# Patient Record
Sex: MALE | Race: WHITE | NOT HISPANIC OR LATINO | Employment: OTHER | URBAN - METROPOLITAN AREA
[De-identification: names, ages, dates, MRNs, and addresses within clinical notes are randomized per-mention and may not be internally consistent; named-entity substitution may affect disease eponyms.]

---

## 2017-01-17 ENCOUNTER — OFFICE VISIT (OUTPATIENT)
Dept: NEUROLOGY | Facility: HOSPITAL | Age: 82
End: 2017-01-17
Attending: SURGERY
Payer: MEDICARE

## 2017-01-17 ENCOUNTER — CLINICAL SUPPORT (OUTPATIENT)
Dept: NEUROLOGY | Facility: HOSPITAL | Age: 82
End: 2017-01-17
Payer: MEDICARE

## 2017-01-17 VITALS
HEART RATE: 45 BPM | SYSTOLIC BLOOD PRESSURE: 161 MMHG | WEIGHT: 183 LBS | RESPIRATION RATE: 18 BRPM | TEMPERATURE: 97 F | BODY MASS INDEX: 26.2 KG/M2 | HEIGHT: 70 IN | DIASTOLIC BLOOD PRESSURE: 82 MMHG

## 2017-01-17 DIAGNOSIS — Z71.89 ENCOUNTER FOR DIABETES EDUCATION: ICD-10-CM

## 2017-01-17 DIAGNOSIS — C7B.8 SECONDARY NEUROENDOCRINE TUMOR OF LIVER: ICD-10-CM

## 2017-01-17 DIAGNOSIS — C7A.012 MALIGNANT CARCINOID TUMOR OF ILEUM: ICD-10-CM

## 2017-01-17 DIAGNOSIS — K86.89 PANCREATIC INSUFFICIENCY: Primary | ICD-10-CM

## 2017-01-17 DIAGNOSIS — C7B.8 SECONDARY NEUROENDOCRINE TUMOR OF DISTANT LYMPH NODES: ICD-10-CM

## 2017-01-17 DIAGNOSIS — C7A.012 MALIGNANT CARCINOID TUMOR OF ILEUM: Primary | ICD-10-CM

## 2017-01-17 PROCEDURE — 99212 OFFICE O/P EST SF 10 MIN: CPT | Mod: 27

## 2017-01-17 RX ORDER — PANCRELIPASE 24000; 76000; 120000 [USP'U]/1; [USP'U]/1; [USP'U]/1
2 CAPSULE, DELAYED RELEASE PELLETS ORAL
Qty: 900 CAPSULE | Refills: 3 | Status: SHIPPED | OUTPATIENT
Start: 2017-01-17

## 2017-01-17 RX ORDER — LANREOTIDE ACETATE 120 MG/.5ML
120 INJECTION SUBCUTANEOUS
COMMUNITY

## 2017-01-17 NOTE — PROGRESS NOTES
"NOLANETS:  [facility]  A collaboration between [facility] and [facility]    PATIENT: Delmar Mckee  MRN: [id number]  DATE: 1/17/2017    Vitals:    01/17/17 1049 01/17/17 1054   BP:  (!) 161/82   Pulse: (!) 45    Resp: 18    Temp: 97 °F (36.1 °C)    TempSrc: Oral    Weight: 83 kg (183 lb)    Height: 5' 10" (1.778 m)       Last 2 Weight Measurements:   Wt Readings from Last 2 Encounters:   01/17/17 83 kg (183 lb)   09/23/16 83.9 kg (185 lb)     BMI: Body mass index is 26.26 kg/(m^2).    Karnofsky Score    Karnofsky Score:  100% - Normal, No Complaints, No Evidence of Disease        Diagnosis:   1. 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 Secondary neuroendocrine tumor of liver      Interval History: Mr. 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-- now on lanreotide -- following his original surgery had nanoknife/RFA followed by annemarie ferguson x 2     Past Medical History   Diagnosis Date    Diabetes mellitus type II, controlled     Drug therapy      lanreotide 6/2016    HTN (hypertension) 2016    Hypothyroidism     Malignant carcinoid tumor of ileum 2009       Past Surgical History   Procedure Laterality Date    Abdominal surgery  2009    Drug therapy       sandostatin 30 mg every 28 days    Drug trial  2015 summer     pazopanib    Nanoknofe  2011    Rfa  2011     Pt denies having in 2012, 2013 (7/26/16)    Annemarie ferguson  2012, 2013       Review of patient's allergies indicates:   Allergen Reactions    Epinephrine Other (See Comments)     Carcinoid crisis    Latex, natural rubber Rash       Current Outpatient Prescriptions   Medication Sig Dispense Refill    aspirin (ECOTRIN) 81 MG EC tablet Take by mouth.      atorvastatin (LIPITOR) 10 MG tablet 10 mg every evening.       cholecalciferol, vitamin D3, 2,000 unit " Tab Take 1 tablet by mouth once daily.       cholestyramine (QUESTRAN) 4 gram packet TAKE 1 PACKET TWICE DAILY IN WATER  0    cinnamon bark 500 mg capsule Take by mouth.      co-enzyme Q-10 30 mg capsule Take by mouth.      CREON 24,000-76,000 -120,000 unit capsule EARLY TILL 1/21*1 CAP(S) BY mouth daily with every meal  3    lanreotide (SOMATULINE DEPOT) 120 mg/0.5 mL Syrg Inject 120 mg into the skin every 28 days.      levothyroxine (SYNTHROID, LEVOTHROID) 175 MCG tablet Take 175 mcg by mouth once daily.  8    metformin (GLUCOPHAGE) 850 MG tablet Take 850 mg by mouth 2 (two) times daily with meals.   3    multivitamin (THERAGRAN) per tablet Take 1 tablet by mouth once daily.       ramipril (ALTACE) 10 MG capsule 2 (two) times daily.       saw palmetto fruit 450 mg Cap Take 1 capsule by mouth.      temazepam (RESTORIL) 15 mg Cap 15 mg every evening.       terazosin (HYTRIN) 1 MG capsule 1 mg every evening.       vitamin A 8000 UNIT capsule Take 8,000 Units by mouth.      levothyroxine (SYNTHROID) 150 MCG tablet Take 150 mcg by mouth.       No current facility-administered medications for this visit.        Review of Systems     Number of Days per Week Number per Day   DIARRHEA occasional    BRISTOL STOOL SCALE RATING Type 4-6    FLUSHING With alcohol    WHEEZING 0      WEIGHT GAIN/LOSS stable   ENERGY RATING (0-10) 10      Physical Exam deferred.     Pathology Data:  No new tissue      Laboratory Data:  Neuroendocrine Labs Latest Ref Rng & Units 12/8/2016   EXT 5 HIAA 24 HR URINE 0 - 22 ng/ml 335 (A)   EXT 5 HIAA BLOOD 0 - 22 ng/ml    EXT SEROTONIN 21 - 321 ng/ml 1099 (A)   EXT CHROMOGRANIN A 0 - 5 nmol/l    EXT PANCREASTATIN LIZETTE 10 - 135 pg/ml 911 (A)   EXT NEUROKININ A LIZETTE 0 - 40 pg/ml 15   OCTREOTIDE LEVEL     EXT OCTREOTIDE LEVEL pg/ml    WBC 3.90 - 12.70 K/uL    EXT WBC 3.4 - 10.8 x10e3/ul    HGB 14.0 - 18.0 g/dL    EXT HGB 12.6 - 17.7 g/dl    HCT 40.0 - 54.0 %    EXT HCT 37.5 - 51.0 %     PLATLETS 150 - 350 K/uL    EXT PLATLETS 150 - 379 x10e3/ul    PT 9.0 - 12.5 sec    PTT 21.0 - 32.0 sec    INR 0.8 - 1.2    GLUCOSE 70 - 110 mg/dL    EXT GLUCOSE 65 - 99 mg/dl    BUN 8 - 23 mg/dL    EXT BUN 8 - 27 mg/dl    CREATININE 0.5 - 1.4 mg/dL    EXT CREATININE 0.76 - 1.27 mg/dl     - 145 mmol/L    EXT  - 144 mmol/l    K 3.5 - 5.1 mmol/L    EXT K 3.5 - 5.2 mmol/l    CHLORIDE 95 - 110 mmol/L    EXT CHLORIDE 97 - 106 mmol/l    CO2 23 - 29 mmol/L    EXT CO2 18 - 29 mmol/l    CALCIUM 8.7 - 10.5 mg/dL    EXT CALCIUM 8.6 - 10.2 mg/dl    PROTEIN, TOTAL 6.0 - 8.4 g/dL    EXT PROTEIN, TOTAL 6.0 - 8.5 g/dl    ALBUMIN 3.5 - 5.2 g/dL    EXT ALBUMIN 3.5 - 4.8 g/dl    TOTAL BILIRUBIN 0.1 - 1.0 mg/dL    EXT TOTAL BILIRUBIN 0.0 - 1.2 mg/dl    ALK PHOSPHATASE 55 - 135 U/L    EXT ALK PHOSPHATASE 39 - 117 iu/l    SGOT (AST) 10 - 40 U/L    EXT SGOT (AST) 0 - 40 iu/l    SGPT (ALT) 10 - 44 U/L    EXT ALT 0 - 44 iu/l    Weight               Radiology Data:                        Impression:  Increase in liver lesion s/p TACE  Recent--consider additional regional therapy     Plan: consult IR about additional TACE      Labs: Markers: 3 month intervals            Other: 6 month intervals    Scans: 6 month intervals    Return to Clinic:6 month intervals    Orders placed this visit: No orders of the defined types were placed in this encounter.       25 Minutes Face-to-Face; Counseling/Coordination of Care > 50 percent of Visit     Bogdan Waterman MD, FACS  [employer] Professor of Surgery, [facility]  [address], [address]  [place]  [address]  Cell [phone]  ph. [phone]; [phone]  fax. [phone]  [email or web address]

## 2017-01-17 NOTE — PATIENT INSTRUCTIONS
Nurse Parrish will speak with interventional radiologist to discuss plan for upcoming scheduled TACE #2 in March 9 and 10 2017- will notify patient after advised    Echocardiogram yearly in April

## 2017-01-17 NOTE — PATIENT INSTRUCTIONS
Diabetes: Meal Planning  You can help keep your blood sugar level in your target range by eating healthy foods. Your healthcare team can help you create a low-fat, nutritious meal plan. Take an active role in your diabetes management by following your meal plan and working with your healthcare team.    Make your meal plan  A meal plan gives guidelines for the types and amounts of food you should eat. The goal is to balance food and insulin (or other diabetes medications) so your blood sugars will be in your target range. Your dietitian will help you make a flexible meal plan that includes many foods that you like.  Watch serving sizes  Your meal plan will group foods by servings. To learn how much a serving is, start by measuring food portions at each meal. Soon youll know what a serving looks like on your plate. Ask your healthcare provider about how to balance servings of different foods.  Eat from all the food groups  The basis of a healthy meal plan is variety (eating lots of different foods). Choose lean meats, fresh fruits and vegetables, whole grains, and low-fat or nonfat dairy products. Eating a wide variety of foods provides the nutrients your body needs. It can also keep you from getting bored with your meal plan.  Learn about carbohydrates, fats, and protein  · Carbohydrates are starches, sugars, and fiber. They are found in many foods, including fruit, bread, pasta, milk, and sweets. Of all the foods you eat, carbohydrates have the most effect on your blood sugar. Your dietitian may teach you about carb counting, a way to figure out the number of carbohydrates in a meal.  · Fats have the most calories. They also have the most effect on your weight and your risk of heart disease. When you have diabetes, its important to control your weight and protect your heart. Foods that are high in fat include whole milk, cheese, snack foods, and desserts.  · Protein is important for building and repairing  muscles and bones. Choose low-fat protein sources, such as fish, egg whites, and skinless chicken.  Reduce liquid sugars  Extra calories from sodas, sports drinks, and fruit drinks make it hard to keep blood sugar in range. Cut as many liquid sugars from your meal plan as you can.  This includes most fruit juices, which are often high in natural or added sugar. Instead, drink plenty of water and other sugar-free beverages.  Eat less fat  If you need to lose weight, try to reduce the amount of fat in your diet. This can also help lower your cholesterol level to keep blood vessels healthier. Cut fat by using only small amounts of liquid oil for cooking. Read food labels carefully to avoid foods with unhealthy trans fats.  Timing your meals  When it comes to blood sugar control, when you eat is as important as what you eat. You may need to eat several small meals spaced evenly throughout the day to stay in your target range. So dont skip breakfast or wait until late in the day to get most of your calories. Doing so can cause your blood sugar to rise too high or fall too low.   © 3015-1237 [employer]. [address] [place] [address]. All rights reserved. This information is not intended as a substitute for professional medical care. Always follow your healthcare professional's instructions.      Preventing Diarrhea due to Rapid Transit Time    *Symptoms of dumping syndrome include: feeling weak, dizzy and/or flushed within 30 minutes of eating. Cramping, pain, nausea, diarrhea and/or sweating may also occur.     Tips to avoid diarrhea related to rapid transit and dumping syndrome:   Eat 4-6 small meals throughout the day   Try to eat a source of protein at each meal (such as: poultry, red meat, eggs, tofu, milk, yogurt, or cheese)   Limit concentrated sugars like candies, cookies, soda, juice, and syrup   Drink fluids 30-60 minutes before and after meals and snacks, but not during  meals   Choose foods with soluble fibers (such as: oats, quinoa, fruits and vegetables without peels, and legumes)   Avoid extreme hot or cold foods and beverages   Eat slowly and rest for 15 minutes after a meal with feet up    Food Group Foods Allowed Foods to Avoid   Beverages  6-8 cups daily (no ice) Decaffeinated coffee and tea. Sugar free beverages, water drinks without sugar, water Caffeinated coffee or tea. Beverages made with sugar, corn syrup, or honey. Fruit juices and fruit drinks. Carbonated drinks.    Starches/Grains  8-12 servings daily Complex carbohydrates (such as: white bread, oatmeal, quinoa, cereal, potatoes, barley, white rice, and pasta).   High soluble fiber Sweet rolls, donuts, pastries, and cakes. Sugar cereals. Whole wheat, rye, pumpernickel brown rice and whole grains.  Low insoluble fiber   Meat and Other Protein Foods  Limit red meat to 12 ounces weekly Tender, well-cooked meats, poultry, and fish. Egg whites (whole eggs if tolerated). Soy foods prepared without added fat. Smooth peanut butter allowed if tolerable of fats. Powdered peanut butter (PB2) Fried meat, poultry, or fish. Luncheon meats (i.e. Bologna or salami). Sausage, hot dogs, dubois. Tough or chewy meats. Dried beans and peas. (i.e. Luong or kidney beans). Seeds or nuts.   Fats  1-3 tsp per meal A small to moderate amount of fat as tolerated: MCT oil, coconut oil, olive oil, canola, butter, margarine, cream, and cream cheese. High amounts of fat such as fried foods, avocados, olives, butter, vegetable oils, fried foods.   Fruits  3-6 servings daily Canned in fruit juice, light syrup and drained. Fresh fruit without the peel. Diluted fruit 1 part to 3 water Canned fruit in sugar or syrup. Fruit juice. Dried fruits including prunes and raisins.   Vegetables  3-6 servings daily Fresh, frozen or canned vegetables cooked to a soft consistency. Raw vegetables (unless finely chopped).   Milk or Milk Foods  2-3 servings daily  Milk (buttermilk, skim, 1% fat, and soy milk with no added sugar). Plain yogurt with no added sugar. Lactose free products. Cheese. Low-fat sugar ice cream. Whole milk, chocolate milk, half and half, regular ice cream, or creamers.   Miscellaneous Any allowed foods made with artificial sweeteners including: saccharin (Sweet 'N Low), sucralose (Splenda), and acesulfame potassium (Sunette, SweetOne), Stevia. Sugar, honey, syrup, jelly. Any sugar alcohol (such as: sorbitol, isomalt, hydrogenated starch hydrosylat or mannitol or xylitol).  Foods that list sugar, honey, syrup, xylitol, or sorbitol as one of the first three ingredients on the food label.     Source: http://applications.[employer].org/education/document.aspx?url=Patient+Education%7Nk84665.pdf

## 2017-01-17 NOTE — LETTER
January 17, 2017        Rian Melendez DO  [address]  [facility] [address]       NOLANETS: [facility]  A collaboration between [facility] and [facility] [address]  [address]  [place] [address]  Phone: [phone]  Fax: [phone]        FRANCINE Vizcarra M.D., FACS  Charbel Kebede M.D.  Obinna Mccollum M.D.   Dawn Braun M.D., FACS  DO Bogdan Dalal M.D., FACS   Patient: Delmar Mckee   MR Number: [id number]   YOB: 1935   Date of Visit: 1/17/2017     Dear Dr. Melendez    Thank you for the kind referral of Delmar Mckee. We saw this patient on 1/17/2017, and a copy of our clinic note is enclosed. We certainly appreciate the opportunity to participate in your patient's care.     If you have any questions or wish to discuss your patient further, please do not hesitate to contact us at [phone].       Kindest personal regards,          Bogdan Waterman MD, FACS  The And[employer] Herman Professor of Surgery & Neurosciences  [facility], Dept. Of Surgery  [address] [place] [address], [address]  [place] [address] [phone]

## 2017-01-17 NOTE — MR AVS SNAPSHOT
[facility]  [address] [place] [address]  Phone: [phone]  Fax: [phone]                  Delmar Mckee   2017 11:30 AM   Office Visit    Description:  Male : 1935   Provider:  Bogdan Waterman MD   Department:  [facility]           Reason for Visit     Hepatic Tumor     Carcinoid Tumor     Follow-up     ileum     Lymph Node Metastasis           Diagnoses this Visit        Comments    Malignant carcinoid tumor of ileum    -  Primary     Secondary neuroendocrine tumor of distant lymph nodes         Secondary neuroendocrine tumor of liver                To Do List           Future Appointments        Provider Department Dept Phone    3/9/2017 11:45 AM Dwight Braun MD [facility] [phone]      Goals (5 Years of Data)     None      Follow-Up and Disposition     Return in about 6 months (around 2017).    Follow-up and Disposition History      [facility]     [facility] -  Assistance  Registered nurses in the [facility] provide clinical advisement, health education, appointment booking, and other advisory services.  Call for this free service at [phone].             Medications           Message regarding Medications     Verify the changes and/or additions to your medication regime listed below are the same as discussed with your clinician today.  If any of these changes or additions are incorrect, please notify your healthcare provider.        STOP taking these medications     magnesium oxide-Mg AA chelate 133 mg Tab Take by mouth.    ondansetron (ZOFRAN-ODT) 8 MG TbDL Take 1 tablet (8 mg total) by mouth every 6 (six) hours as needed (nausea).    oxycodone-acetaminophen (PERCOCET) 5-325 mg per tablet Take 1 tablet by mouth every 4 (four) hours as needed for Pain.           Verify that the below list of medications is an accurate representation of the  "medications you are currently taking.  If none reported, the list may be blank. If incorrect, please contact your healthcare provider. Carry this list with you in case of emergency.           Current Medications     aspirin (ECOTRIN) 81 MG EC tablet Take by mouth.    atorvastatin (LIPITOR) 10 MG tablet 10 mg every evening.     cholecalciferol, vitamin D3, 2,000 unit Tab Take 1 tablet by mouth once daily.     cholestyramine (QUESTRAN) 4 gram packet TAKE 1 PACKET TWICE DAILY IN WATER    cinnamon bark 500 mg capsule Take by mouth.    co-enzyme Q-10 30 mg capsule Take by mouth.    CREON 24,000-76,000 -120,000 unit capsule EARLY TILL 1/21*1 CAP(S) BY mouth daily with every meal    lanreotide (SOMATULINE DEPOT) 120 mg/0.5 mL Syrg Inject 120 mg into the skin every 28 days.    levothyroxine (SYNTHROID, LEVOTHROID) 175 MCG tablet Take 175 mcg by mouth once daily.    metformin (GLUCOPHAGE) 850 MG tablet Take 850 mg by mouth 2 (two) times daily with meals.     multivitamin (THERAGRAN) per tablet Take 1 tablet by mouth once daily.     ramipril (ALTACE) 10 MG capsule 2 (two) times daily.     saw palmetto fruit 450 mg Cap Take 1 capsule by mouth.    temazepam (RESTORIL) 15 mg Cap 15 mg every evening.     terazosin (HYTRIN) 1 MG capsule 1 mg every evening.     vitamin A 8000 UNIT capsule Take 8,000 Units by mouth.    levothyroxine (SYNTHROID) 150 MCG tablet Take 150 mcg by mouth.           Clinical Reference Information           Vital Signs - Last Recorded  Most recent update: 1/17/2017 10:55 AM by Angeline Lizama RN    BP Pulse Temp Resp Ht Wt    (!) 161/82 (!) 45 97 °F (36.1 °C) (Oral) 18 5' 10" (1.778 m) 83 kg (183 lb)    BMI                26.26 kg/m2          Blood Pressure          Most Recent Value    BP  (!)  161/82      Allergies as of 1/17/2017     Epinephrine    Latex, Natural Rubber      Immunizations Administered on Date of Encounter - 1/17/2017     None      Orders Placed During Today's Visit      Normal Orders This " Visit    5-HIAA Plasma (Neuroendocrine)     CBC auto differential     Comprehensive metabolic panel     CT Abdomen Pelvis With Contrast - Triple Phase     Lanreotide Drug Levels     MRI Abdomen W WO Contrast     Neurokinin A     Pancreastatin     Serotonin (Neuroendo)     Future Labs/Procedures Expected by Expires    5-HIAA Plasma (Neuroendocrine)  1/17/2017 3/18/2018    CT Abdomen Pelvis With Contrast - Triple Phase  1/17/2017 1/17/2018    Lanreotide Drug Levels  1/17/2017 3/18/2018    MRI Abdomen W WO Contrast  1/17/2017 1/17/2018    Neurokinin A  1/17/2017 3/18/2018    Pancreastatin  1/17/2017 3/18/2018    Serotonin (Neuroendo)  1/17/2017 3/18/2018    CBC auto differential  7/16/2017 3/18/2018    Comprehensive metabolic panel  7/16/2017 3/18/2018      Instructions    Nurse Shivani will speak with interventional radiologist to discuss plan for upcoming scheduled TACE #2 in March 9 and 10 2017- will notify patient after advised    Echocardiogram yearly in April

## 2017-01-26 ENCOUNTER — TELEPHONE (OUTPATIENT)
Dept: NEUROLOGY | Facility: HOSPITAL | Age: 82
End: 2017-01-26

## 2017-01-26 NOTE — TELEPHONE ENCOUNTER
Called pt to notify of below stated per Dr. Tinsley. Unable to leave message. Will reattempt to contact pt at later time.

## 2017-01-26 NOTE — TELEPHONE ENCOUNTER
----- Message from Jayme Tinsley MD sent at 1/20/2017  8:58 AM CST -----  Left. And he definitely will need treatment on the right. He will need new scans before then. Plan may change depending on the findings on that study. He has considerable disease and I would suggest treatment earlier than in March. Some of his lesions appear larger than on the pre treatment scan and I don't know if this is bc the pre treatment scan was 5 months before the treatment or not. It is very important to get imaging within 30 days of TACE.    Jayme      ----- Message -----     From: Shivani Zaldivar LPN     Sent: 1/20/2017   8:26 AM       To: MD Dr. Alvina Ackerman,    Dr. Waterman would like you to review this patient's scans and advise whether you will be doing the right or left or both sides when he returns for his next TACE in March 2017.     Thank you,    Shivani : )

## 2017-01-30 NOTE — TELEPHONE ENCOUNTER
[facility]       Caller: [phone] (Today,  3:37 PM)                     EAW- Patient is calling in regards to his past TACE procedure and to look at the MRI's to see what the next steps are. Please call back to assist.

## 2017-01-30 NOTE — TELEPHONE ENCOUNTER
Returned pt's call. Informed of below stated. Pt reports he is sending an email to Dr. Waterman to see if he can do CAPTEM instead of having any further TACE and make a decision from there.

## 2017-01-31 ENCOUNTER — TELEPHONE (OUTPATIENT)
Dept: NEUROLOGY | Facility: HOSPITAL | Age: 82
End: 2017-01-31

## 2017-01-31 NOTE — TELEPHONE ENCOUNTER
----- Message from Karely Waggoner sent at 1/31/2017  2:46 PM CST -----  EAW- Patient states he would like his updated CT orders mailed to him.

## 2017-02-10 ENCOUNTER — TELEPHONE (OUTPATIENT)
Dept: NEUROLOGY | Facility: HOSPITAL | Age: 82
End: 2017-02-10

## 2017-02-10 NOTE — TELEPHONE ENCOUNTER
----- Message from Jeanie Washburn sent at 2/10/2017 11:14 AM CST -----  Contact: Patient  EAW- Patient called regarding the skipping shot and possible seeing Dr. Rodriguez. Patients call back number [phone]

## 2017-02-13 ENCOUNTER — TELEPHONE (OUTPATIENT)
Dept: NEUROLOGY | Facility: HOSPITAL | Age: 82
End: 2017-02-13

## 2017-02-13 NOTE — TELEPHONE ENCOUNTER
----- Message from Adwoa Reaves sent at 2/13/2017  2:39 PM CST -----  Contact: Patient  EAW- Patient is returning Summers call. Please call the patient at  [phone].

## 2017-03-02 ENCOUNTER — TELEPHONE (OUTPATIENT)
Dept: NEUROLOGY | Facility: HOSPITAL | Age: 82
End: 2017-03-02

## 2017-03-02 NOTE — TELEPHONE ENCOUNTER
----- Message from Karely Waggoner sent at 3/1/2017  4:09 PM CST -----  EAW- Patient is calling regarding info on the GA68 scan. Please call patient back at [phone]. Thanks

## 2017-03-02 NOTE — TELEPHONE ENCOUNTER
Spoke with pt regarding availbility of Ga 68 scan in June.  He is sched to get TACE #2 next week.  Suggested that it is best to have the Ga 68 scan 2-3 mos post last TACE.  Verbalized understanding.

## 2017-03-09 ENCOUNTER — OFFICE VISIT (OUTPATIENT)
Dept: NEUROLOGY | Facility: HOSPITAL | Age: 82
DRG: 988 | End: 2017-03-09
Attending: SURGERY
Payer: MEDICARE

## 2017-03-09 ENCOUNTER — INFUSION (OUTPATIENT)
Dept: INFUSION THERAPY | Facility: HOSPITAL | Age: 82
DRG: 988 | End: 2017-03-09
Attending: SURGERY
Payer: MEDICARE

## 2017-03-09 VITALS
HEIGHT: 69 IN | SYSTOLIC BLOOD PRESSURE: 132 MMHG | DIASTOLIC BLOOD PRESSURE: 85 MMHG | RESPIRATION RATE: 18 BRPM | BODY MASS INDEX: 27.49 KG/M2 | WEIGHT: 185.63 LBS | HEART RATE: 48 BPM | TEMPERATURE: 98 F

## 2017-03-09 DIAGNOSIS — C7B.02 SECONDARY MALIGNANT CARCINOID TUMOR OF LIVER: ICD-10-CM

## 2017-03-09 DIAGNOSIS — C7B.01 SECONDARY CARCINOID TUMOR OF DISTANT LYMPH NODES: ICD-10-CM

## 2017-03-09 DIAGNOSIS — C78.7 SECONDARY MALIGNANT NEOPLASM OF LIVER: ICD-10-CM

## 2017-03-09 DIAGNOSIS — C7B.8 SECONDARY NEUROENDOCRINE TUMOR OF LIVER: Primary | ICD-10-CM

## 2017-03-09 DIAGNOSIS — C7B.8 SECONDARY NEUROENDOCRINE TUMOR OF LIVER: ICD-10-CM

## 2017-03-09 DIAGNOSIS — C7A.012 MALIGNANT CARCINOID TUMOR OF ILEUM: ICD-10-CM

## 2017-03-09 RX ORDER — MIDAZOLAM HYDROCHLORIDE 5 MG/ML
0.5 INJECTION INTRAMUSCULAR; INTRAVENOUS ONCE
Status: CANCELLED | OUTPATIENT
Start: 2017-03-09 | End: 2017-03-09

## 2017-03-09 RX ORDER — LANREOTIDE ACETATE 120 MG/.5ML
120 INJECTION SUBCUTANEOUS ONCE
Status: CANCELLED | OUTPATIENT
Start: 2017-03-09 | End: 2017-03-09

## 2017-03-09 RX ORDER — IBUPROFEN 200 MG
400 TABLET ORAL EVERY 4 HOURS PRN
Status: CANCELLED | OUTPATIENT
Start: 2017-03-09

## 2017-03-09 RX ORDER — SODIUM CHLORIDE 9 MG/ML
500 INJECTION, SOLUTION INTRAVENOUS ONCE
Status: CANCELLED | OUTPATIENT
Start: 2017-03-09 | End: 2017-03-09

## 2017-03-09 RX ORDER — DEXAMETHASONE SODIUM PHOSPHATE 4 MG/ML
8 INJECTION, SOLUTION INTRA-ARTICULAR; INTRALESIONAL; INTRAMUSCULAR; INTRAVENOUS; SOFT TISSUE ONCE
Status: CANCELLED | OUTPATIENT
Start: 2017-03-09 | End: 2017-03-09

## 2017-03-09 RX ORDER — HYDROMORPHONE HCL IN 0.9% NACL 6 MG/30 ML
PATIENT CONTROLLED ANALGESIA SYRINGE INTRAVENOUS CONTINUOUS
Status: CANCELLED | OUTPATIENT
Start: 2017-03-09

## 2017-03-09 RX ORDER — FAMOTIDINE 20 MG/1
20 TABLET, FILM COATED ORAL EVERY 12 HOURS
Status: CANCELLED | OUTPATIENT
Start: 2017-03-09

## 2017-03-09 RX ORDER — DEXTROSE MONOHYDRATE, SODIUM CHLORIDE, AND POTASSIUM CHLORIDE 50; 1.49; 4.5 G/1000ML; G/1000ML; G/1000ML
INJECTION, SOLUTION INTRAVENOUS CONTINUOUS
Status: CANCELLED | OUTPATIENT
Start: 2017-03-09

## 2017-03-09 RX ORDER — LANREOTIDE ACETATE 120 MG/.5ML
120 INJECTION SUBCUTANEOUS ONCE
Status: COMPLETED | OUTPATIENT
Start: 2017-03-09 | End: 2017-03-09

## 2017-03-09 RX ORDER — LIDOCAINE HYDROCHLORIDE 10 MG/ML
1 INJECTION, SOLUTION EPIDURAL; INFILTRATION; INTRACAUDAL; PERINEURAL ONCE
Status: CANCELLED | OUTPATIENT
Start: 2017-03-09 | End: 2017-03-09

## 2017-03-09 RX ORDER — NALOXONE HCL 0.4 MG/ML
0.02 VIAL (ML) INJECTION
Status: CANCELLED | OUTPATIENT
Start: 2017-03-09

## 2017-03-09 RX ORDER — ONDANSETRON 2 MG/ML
4 INJECTION INTRAMUSCULAR; INTRAVENOUS EVERY 12 HOURS PRN
Status: CANCELLED | OUTPATIENT
Start: 2017-03-09

## 2017-03-09 RX ORDER — LACTULOSE 10 G/15ML
10 SOLUTION ORAL; RECTAL
COMMUNITY

## 2017-03-09 RX ORDER — ONDANSETRON 2 MG/ML
4 INJECTION INTRAMUSCULAR; INTRAVENOUS EVERY 8 HOURS PRN
Status: CANCELLED | OUTPATIENT
Start: 2017-03-09

## 2017-03-09 RX ORDER — NATEGLINIDE 60 MG/1
60 TABLET ORAL
COMMUNITY

## 2017-03-09 RX ORDER — FUROSEMIDE 10 MG/ML
20 INJECTION INTRAMUSCULAR; INTRAVENOUS ONCE
Status: CANCELLED | OUTPATIENT
Start: 2017-03-09 | End: 2017-03-09

## 2017-03-09 RX ORDER — DIPHENHYDRAMINE HYDROCHLORIDE 50 MG/ML
50 INJECTION INTRAMUSCULAR; INTRAVENOUS ONCE
Status: CANCELLED | OUTPATIENT
Start: 2017-03-09 | End: 2017-03-09

## 2017-03-09 RX ORDER — FENTANYL CITRATE 50 UG/ML
50 INJECTION, SOLUTION INTRAMUSCULAR; INTRAVENOUS ONCE
Status: CANCELLED | OUTPATIENT
Start: 2017-03-09 | End: 2017-03-09

## 2017-03-09 RX ORDER — MANNITOL 250 MG/ML
12.5 INJECTION, SOLUTION INTRAVENOUS ONCE
Status: CANCELLED | OUTPATIENT
Start: 2017-03-09 | End: 2017-03-09

## 2017-03-09 RX ADMIN — LANREOTIDE ACETATE 120 MG: 120 INJECTION SUBCUTANEOUS at 01:03

## 2017-03-09 NOTE — NURSING
Left message for pt to arrive at 0700.. advd npo [place] and take all meds with exception of blood thinners and dm meds. advd must have a ride.

## 2017-03-09 NOTE — PROGRESS NOTES
"NOLANETS:  [facility]  A collaboration between [facility] and [facility]      PATIENT: Delmar Mckee  MRN: [id number]  DATE: 3/9/2017    Subjective:      Chief Complaint: No chief complaint on file.  feeling ok    Has left lower quadrant pain      Vitals:   Vitals:    03/09/17 1141   BP: 132/85   Pulse: (!) 48   Resp: 18   Temp: 98.2 °F (36.8 °C)   TempSrc: Oral   Weight: 84.2 kg (185 lb 9.6 oz)   Height: 5' 9" (1.753 m)        Karnofsky Score:     Diagnosis: No diagnosis found.     Oncologic History:     Interval History:     Past Medical History:  Past Medical History:   Diagnosis Date    Diabetes mellitus type II, controlled     Drug therapy     lanreotide 6/2016    HTN (hypertension) 2016    Hypothyroidism     Malignant carcinoid tumor of ileum 2009       Past Surgical History:  Past Surgical History:   Procedure Laterality Date    ABDOMINAL SURGERY  2009    bland embo  2012, 2013    drug therapy      sandostatin 30 mg every 28 days    drug trial  2015 summer    pazopanib    nanoknofe  2011    RFA  2011    Pt denies having in 2012, 2013 (7/26/16)       Family History:  No family history on file.    Allergies:  Review of patient's allergies indicates:   Allergen Reactions    Epinephrine Anaphylaxis     Can cause Carcinoid Crisis    Latex, natural rubber Rash       Medications:  Current Outpatient Prescriptions   Medication Sig Dispense Refill    atorvastatin (LIPITOR) 10 MG tablet 10 mg every evening.       cholecalciferol, vitamin D3, 2,000 unit Tab Take 1 tablet by mouth once daily.       cholestyramine (QUESTRAN) 4 gram packet TAKE 1 PACKET TWICE DAILY IN WATER  0    CREON 24,000-76,000 -120,000 unit capsule Take 2 capsules by mouth 4 (four) times daily with meals and nightly. (Patient taking differently: Take 2 capsules by mouth 3 (three) times daily with meals. ) 900 capsule 3    lactulose " (CHRONULAC) 10 gram/15 mL solution Take 10 g by mouth 3 (three) times daily.      lanreotide (SOMATULINE DEPOT) 120 mg/0.5 mL Syrg Inject 120 mg into the skin every 28 days.      levothyroxine (SYNTHROID, LEVOTHROID) 175 MCG tablet Take 175 mcg by mouth once daily.  8    metformin (GLUCOPHAGE) 850 MG tablet Take 850 mg by mouth 2 (two) times daily with meals.   3    multivitamin (THERAGRAN) per tablet Take 1 tablet by mouth once daily.       nateglinide (STARLIX) 60 MG tablet Take 60 mg by mouth 3 (three) times daily before meals.      ramipril (ALTACE) 10 MG capsule 2 (two) times daily.       saw palmetto fruit 450 mg Cap Take 1 capsule by mouth 2 (two) times daily.       temazepam (RESTORIL) 15 mg Cap 15 mg every evening.       terazosin (HYTRIN) 1 MG capsule 1 mg every evening.       vitamin A 8000 UNIT capsule Take 8,000 Units by mouth.      aspirin (ECOTRIN) 81 MG EC tablet Take by mouth.      cinnamon bark 500 mg capsule Take by mouth.      co-enzyme Q-10 30 mg capsule Take 30 mg by mouth once daily.        No current facility-administered medications for this visit.      Facility-Administered Medications Ordered in Other Visits   Medication Dose Route Frequency Provider Last Rate Last Dose    lanreotide injection 120 mg  120 mg Subcutaneous Once Dwight Braun MD           Review of Systems   Constitutional: Negative for activity change, appetite change, chills, diaphoresis, fatigue, fever and unexpected weight change.   HENT: Negative for drooling, ear discharge, ear pain, facial swelling, hearing loss, mouth sores, nosebleeds, postnasal drip, rhinorrhea, sinus pressure, sneezing, sore throat, tinnitus and trouble swallowing.    Eyes: Negative for photophobia, pain, redness and visual disturbance.   Respiratory: Negative for apnea, cough, choking, chest tightness, shortness of breath, wheezing and stridor.    Cardiovascular: Negative for chest pain, palpitations and leg swelling.    Gastrointestinal: Positive for abdominal pain. Negative for rectal pain and vomiting.   Endocrine: Negative.    Genitourinary: Negative.    Musculoskeletal: Positive for back pain. Negative for arthralgias, joint swelling, myalgias, neck pain and neck stiffness.   Allergic/Immunologic: Negative.    Neurological: Negative.    Hematological: Negative.    Psychiatric/Behavioral: Negative.       Objective:      Physical Exam   Constitutional: He appears well-developed and well-nourished.   HENT:   Head: Atraumatic.   Eyes: EOM are normal.   Neck: Neck supple.   Cardiovascular: Normal rate and regular rhythm.    Pulmonary/Chest: Effort normal and breath sounds normal.   Abdominal: Soft. Bowel sounds are normal.   Minimal tenderness lt side     Musculoskeletal: Normal range of motion.   Neurological: He is alert.   Psychiatric: He has a normal mood and affect.      Assessment:       No diagnosis found.    Laboratory Data:  Results for ROWDY DE LA PAZ (MRN [id number]) as of 3/9/2017 12:49   Ref. Range 9/23/2016 12:00 9/24/2016 09:02 10/19/2016 00:00 12/8/2016 00:00   WBC Latest Ref Range: 3.90 - 12.70 K/uL  11.34     RBC Latest Ref Range: 4.60 - 6.20 M/uL  4.32 (L)     Hemoglobin Latest Ref Range: 14.0 - 18.0 g/dL  13.1 (L)     Hematocrit Latest Ref Range: 40.0 - 54.0 %  40.3     MCV Latest Ref Range: 82 - 98 fL  93     MCH Latest Ref Range: 27.0 - 31.0 pg  30.3     MCHC Latest Ref Range: 32.0 - 36.0 %  32.5     RDW Latest Ref Range: 11.5 - 14.5 %  13.2     Platelets Latest Ref Range: 150 - 350 K/uL  207     MPV Latest Ref Range: 9.2 - 12.9 fL  11.7     Gran% Latest Ref Range: 38.0 - 73.0 %  86.4 (H)     Gran # Latest Ref Range: 1.8 - 7.7 K/uL  9.8 (H)     Lymph% Latest Ref Range: 18.0 - 48.0 %  8.4 (L)     Lymph # Latest Ref Range: 1.0 - 4.8 K/uL  1.0     Mono% Latest Ref Range: 4.0 - 15.0 %  4.9     Mono # Latest Ref Range: 0.3 - 1.0 K/uL  0.6     Eosinophil% Latest Ref Range: 0.0 - 8.0 %  0.0     Eos # Latest Ref Range:  0.0 - 0.5 K/uL  0.0     Basophil% Latest Ref Range: 0.0 - 1.9 %  0.1     Baso # Latest Ref Range: 0.00 - 0.20 K/uL  0.01     Sodium Latest Ref Range: 136 - 145 mmol/L  137     Potassium Latest Ref Range: 3.5 - 5.1 mmol/L  3.6     Chloride Latest Ref Range: 95 - 110 mmol/L  100     CO2 Latest Ref Range: 23 - 29 mmol/L  24     Anion Gap Latest Ref Range: 8 - 16 mmol/L  13     BUN, Bld Latest Ref Range: 8 - 23 mg/dL  17     Creatinine Latest Ref Range: 0.5 - 1.4 mg/dL  1.2     eGFR if non African American Latest Ref Range: >60 mL/min/1.73 m^2  58 (A)     eGFR if African American Latest Ref Range: >60 mL/min/1.73 m^2  >60     Glucose Latest Ref Range: 70 - 110 mg/dL  186 (H)     Calcium Latest Ref Range: 8.7 - 10.5 mg/dL  9.5     Alkaline Phosphatase Latest Ref Range: 55 - 135 U/L  179 (H)     Total Protein Latest Ref Range: 6.0 - 8.4 g/dL  7.1     Albumin Latest Ref Range: 3.5 - 5.2 g/dL  3.7     Total Bilirubin Latest Ref Range: 0.1 - 1.0 mg/dL  0.5     AST Latest Ref Range: 10 - 40 U/L  40     ALT Latest Ref Range: 10 - 44 U/L  25     POCT Glucose Latest Ref Range: 70 - 110 mg/dL 181 (H)      EXT 5 HIAA 24 HR URINE Latest Ref Range: 0 - 22 ng/ml    335 (A)   EXT Albumin Latest Ref Range: 3.5 - 4.8 g/dl   4.2    EXT Alkaline Phosphatase Latest Ref Range: 39 - 117 iu/l   332 (A)    EXT ALT Latest Ref Range: 0 - 44 iu/l   35    EXT AST Latest Ref Range: 0 - 40 iu/l   36    EXT BilirubiN Total Latest Ref Range: 0.0 - 1.2 mg/dl   0.5    EXT BUN Latest Ref Range: 8 - 27 mg/dl   19    EXT Calcium Latest Ref Range: 8.6 - 10.2 mg/dl   9.7    EXT Chloride Latest Ref Range: 97 - 106 mmol/l   99    EXT CO2 Latest Ref Range: 18 - 29 mmol/l   25    EXT Creatinine Latest Ref Range: 0.76 - 1.27 mg/dl   0.97    EXT Glucose Latest Ref Range: 65 - 99 mg/dl   134 (A)    EXT Hematocrit Latest Ref Range: 37.5 - 51.0 %   39.4    EXT Hemoglobin Latest Ref Range: 12.6 - 17.7 g/dl   13.0    EXT LANREOTIDE LEVEL Latest Units: pg/ml    2163    EXT NEUROKININ A LIZETTE Latest Ref Range: 0 - 40 pg/ml    15   EXT PANCREASTATIN LIZETTE Latest Ref Range: 10 - 135 pg/ml    911 (A)   EXT Platelets Latest Ref Range: 150 - 379 x10e3/ul   173    EXT Potassium Latest Ref Range: 3.5 - 5.2 mmol/l   4.3    EXT Protein total Latest Ref Range: 6.0 - 8.5 g/dl   7.2    EXT SEROTONIN Latest Ref Range: 21 - 321 ng/ml    1099 (A)   EXT Sodium Latest Ref Range: 136 - 144 mmol/l   142    EXT WBC Latest Ref Range: 3.4 - 10.8 x10e3/ul   6.8        Impression:  Plan:           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 Will do liver biiopsy along with chemotomorrow              SHANTE Braun MD, FACS   Associate Professor of Surgery, [facility]   Neuroendocrine Surgery, Hepatic/Pancreatic & General Surgery   [address], [address]   [place] [address]   ph. [phone]; [phone]   fax. [phone]

## 2017-03-09 NOTE — PATIENT INSTRUCTIONS
Nothing to eat or drink after midnight tonight  Report to the first floor hospital admissions desk to check in 2 hour before (8 AM) your procedure time (10 AM)      *Remember to have labs (CBC & CMP) 10 days after TACE procedure- orders:    given to patient/placed in EPIC        Chemoembolization for Liver Cancer: The Procedure  Chemoembolization is a way to treat cancer in the liver. It can be used for cancer that starts in the liver. Or it can be used for cancer that has spread (metastasized) to the liver from other parts of the body. The procedure treats only cancer in the liver. It is done by a specially trained doctor called an interventional radiologist.  How does chemoembolization work?    The hepatic artery is a large blood vessel. It sends blood to the liver. To grow, a liver tumor takes most of its blood from this artery. During the procedure, chemotherapy medicines are put into the hepatic artery. The artery is then blocked off from the rest of the body. This makes sure the medicines stay in the liver. And it cuts off blood to the tumor.  The goals of chemoembolization  · Block the tumors blood flow so it gets no oxygen or nutrients  · Send high doses of chemotherapy medicines directly to the tumor site  · Keep chemotherapy medicines in the tumor for long periods of time  · Reduce side effects to the rest of the body. This is because the medicines do not leave the liver.  Getting ready for the procedure  · Do not eat or drink anything for 6 hours before the procedure.  · Tell your healthcare provider what medicines you take. This includes aspirin, supplements, and herbs. Ask if you should stop taking them.  During the procedure  When you arrive for the procedure, an IV (intravenous) line will be put into your arm. This line will give you fluids and medicine to prepare your body for the procedure. This preparation may take several hours. To begin the procedure:  · The healthcare provider puts a long,  flexible tube (catheter) into an artery in your groin.  · The provider puts an X-ray dye (contrast medium) through the catheter. This helps the artery and catheter show up better on X-rays. The provider can see the movement of the catheter on a video monitor.  · The provider guides the catheter to the hepatic artery in the liver. He or she then moves it to the tumor.  · The provider injects the chemoembolization medicines through the catheter. He or she then injects a substance that blocks the artery.  · The catheter is removed. The provider puts pressure is put on the insertion site for 15 minutes. This is to prevent bleeding.  · You will lie flat for several hours. During this time, an IV line will give you fluids. You will likely stay in the hospital for 3 to 5 days after the procedure.  Side effects of chemoembolization  Side effects include tiredness, abdominal pain, fever, nausea, and loss of appetite. These may last for several days. Medicines can help lessen certain side effects.     What are the risks and complications of chemoembolization?  · Blood clot in a blood vessel  · Infection or bruising where the  catheter was inserted  · Death of normal liver tissue. This may lead to liver failure.  · Damage to the gallbladder or other nearby organs  · Problems because of the contrast medium. These might be an allergic reaction or kidney damage.  · Damage to an artery  · Death      Date Last Reviewed: 6/19/2015  © 7060-3989 [employer], [employer] [address] [place] [address], [place] [address]. All rights reserved. This information is not intended as a substitute for professional medical care. Always follow your healthcare professional's instructions.

## 2017-03-09 NOTE — MR AVS SNAPSHOT
[facility]  [address] [facility] [place] [address]  Phone: [phone]  Fax: [phone]                  Delmar Mckee   3/9/2017 11:45 AM   Office Visit    Description:  Male : 1935   Provider:  Dwight Braun MD   Department:  [facility]           Diagnoses this Visit        Comments    Malignant carcinoid tumor of ileum         Secondary malignant carcinoid tumor of liver         Secondary carcinoid tumor of distant lymph nodes         Secondary neuroendocrine tumor of liver                To Do List           Future Appointments        Provider Department Dept Phone    3/10/2017 10:00 AM [facility] RADIOLOGIST1; [facility] IR1 [facility] [phone]      Your Future Surgeries/Procedures     Mar 10, 2017   Surgery with Dosc Diagnostic Provider   [facility] ([facility])    [address] [place] [id number] [phone]              Goals (5 Years of Data)     None      Follow-Up and Disposition     Return in about 3 months (around 2017).    Follow-up and Disposition History      [facility] -  Assistance  Registered nurses in the [facility] provide clinical advisement, health education, appointment booking, and other advisory services.  Call for this free service at [phone].             Medications           Message regarding Medications     Verify the changes and/or additions to your medication regime listed below are the same as discussed with your clinician today.  If any of these changes or additions are incorrect, please notify your healthcare provider.             Verify that the below list of medications is an accurate representation of the medications you are currently taking.  If none reported, the list may be blank. If incorrect, please contact your healthcare provider. Carry this list with you in case of emergency.          "  Current Medications     atorvastatin (LIPITOR) 10 MG tablet 10 mg every evening.     cholecalciferol, vitamin D3, 2,000 unit Tab Take 1 tablet by mouth once daily.     cholestyramine (QUESTRAN) 4 gram packet TAKE 1 PACKET TWICE DAILY IN WATER    CREON 24,000-76,000 -120,000 unit capsule Take 2 capsules by mouth 4 (four) times daily with meals and nightly.    lactulose (CHRONULAC) 10 gram/15 mL solution Take 10 g by mouth 3 (three) times daily.    lanreotide (SOMATULINE DEPOT) 120 mg/0.5 mL Syrg Inject 120 mg into the skin every 28 days.    levothyroxine (SYNTHROID, LEVOTHROID) 175 MCG tablet Take 175 mcg by mouth once daily.    metformin (GLUCOPHAGE) 850 MG tablet Take 850 mg by mouth 2 (two) times daily with meals.     multivitamin (THERAGRAN) per tablet Take 1 tablet by mouth once daily.     nateglinide (STARLIX) 60 MG tablet Take 60 mg by mouth 3 (three) times daily before meals.    ramipril (ALTACE) 10 MG capsule 2 (two) times daily.     saw palmetto fruit 450 mg Cap Take 1 capsule by mouth 2 (two) times daily.     temazepam (RESTORIL) 15 mg Cap 15 mg every evening.     terazosin (HYTRIN) 1 MG capsule 1 mg every evening.     vitamin A 8000 UNIT capsule Take 8,000 Units by mouth.    aspirin (ECOTRIN) 81 MG EC tablet Take by mouth.    cinnamon bark 500 mg capsule Take by mouth.    co-enzyme Q-10 30 mg capsule Take 30 mg by mouth once daily.            Clinical Reference Information           Your Vitals Were     BP Pulse Temp Resp Height Weight    132/85 48 98.2 °F (36.8 °C) (Oral) 18 5' 9" (1.753 m) 84.2 kg (185 lb 9.6 oz)    BMI                27.41 kg/m2          Blood Pressure          Most Recent Value    BP  132/85      Allergies as of 3/9/2017     Epinephrine    Latex, Natural Rubber      Immunizations Administered on Date of Encounter - 3/9/2017     None      Orders Placed During Today's Visit      Normal Orders This Visit    Ambulatory consult to Interventional Radiology       Instructions    Nothing to " eat or drink after midnight tonight  Report to the first floor hospital admissions desk to check in 2 hour before (8 AM) your procedure time (10 AM)      *Remember to have labs (CBC & CMP) 10 days after TACE procedure- orders:    given to patient/placed in EPIC        Chemoembolization for Liver Cancer: The Procedure  Chemoembolization is a way to treat cancer in the liver. It can be used for cancer that starts in the liver. Or it can be used for cancer that has spread (metastasized) to the liver from other parts of the body. The procedure treats only cancer in the liver. It is done by a specially trained doctor called an interventional radiologist.  How does chemoembolization work?    The hepatic artery is a large blood vessel. It sends blood to the liver. To grow, a liver tumor takes most of its blood from this artery. During the procedure, chemotherapy medicines are put into the hepatic artery. The artery is then blocked off from the rest of the body. This makes sure the medicines stay in the liver. And it cuts off blood to the tumor.  The goals of chemoembolization  · Block the tumors blood flow so it gets no oxygen or nutrients  · Send high doses of chemotherapy medicines directly to the tumor site  · Keep chemotherapy medicines in the tumor for long periods of time  · Reduce side effects to the rest of the body. This is because the medicines do not leave the liver.  Getting ready for the procedure  · Do not eat or drink anything for 6 hours before the procedure.  · Tell your healthcare provider what medicines you take. This includes aspirin, supplements, and herbs. Ask if you should stop taking them.  During the procedure  When you arrive for the procedure, an IV (intravenous) line will be put into your arm. This line will give you fluids and medicine to prepare your body for the procedure. This preparation may take several hours. To begin the procedure:  · The healthcare provider puts a long, flexible tube  (catheter) into an artery in your groin.  · The provider puts an X-ray dye (contrast medium) through the catheter. This helps the artery and catheter show up better on X-rays. The provider can see the movement of the catheter on a video monitor.  · The provider guides the catheter to the hepatic artery in the liver. He or she then moves it to the tumor.  · The provider injects the chemoembolization medicines through the catheter. He or she then injects a substance that blocks the artery.  · The catheter is removed. The provider puts pressure is put on the insertion site for 15 minutes. This is to prevent bleeding.  · You will lie flat for several hours. During this time, an IV line will give you fluids. You will likely stay in the hospital for 3 to 5 days after the procedure.  Side effects of chemoembolization  Side effects include tiredness, abdominal pain, fever, nausea, and loss of appetite. These may last for several days. Medicines can help lessen certain side effects.     What are the risks and complications of chemoembolization?  · Blood clot in a blood vessel  · Infection or bruising where the  catheter was inserted  · Death of normal liver tissue. This may lead to liver failure.  · Damage to the gallbladder or other nearby organs  · Problems because of the contrast medium. These might be an allergic reaction or kidney damage.  · Damage to an artery  · Death      Date Last Reviewed: 6/19/2015  © 3912-3785 [employer], [employer]. [address] [place] [address]. All rights reserved. This information is not intended as a substitute for professional medical care. Always follow your healthcare professional's instructions.             Language Assistance Services     ATTENTION: Language assistance services are available, free of charge. Please call [phone].      ATENCIÓN: Si habla abhishek, tiene a mosquera disposición servicios gratuitos de asistencia lingüística. Llame al [phone].     NEENA Ý:  N?u b?n nói Ti?ng Vi?t, có các d?ch v? h? tr? ngôn ng? mi?n phí dành cho b?n. G?i s? [phone].         [facility] complies with applicable Federal civil rights laws and does not discriminate on the basis of race, color, national origin, age, disability, or sex.

## 2017-03-10 ENCOUNTER — HOSPITAL ENCOUNTER (INPATIENT)
Facility: HOSPITAL | Age: 82
LOS: 1 days | Discharge: HOME | DRG: 988 | End: 2017-03-11
Attending: SURGERY | Admitting: SURGERY
Payer: MEDICARE

## 2017-03-10 DIAGNOSIS — C7B.8 SECONDARY NEUROENDOCRINE TUMOR OF LIVER: ICD-10-CM

## 2017-03-10 DIAGNOSIS — C7B.02 SECONDARY MALIGNANT CARCINOID TUMOR OF LIVER: ICD-10-CM

## 2017-03-10 DIAGNOSIS — C7A.012 MALIGNANT CARCINOID TUMOR OF ILEUM: Primary | ICD-10-CM

## 2017-03-10 DIAGNOSIS — C78.7 SECONDARY MALIGNANT NEOPLASM OF LIVER: ICD-10-CM

## 2017-03-10 DIAGNOSIS — C7B.01 SECONDARY CARCINOID TUMOR OF DISTANT LYMPH NODES: ICD-10-CM

## 2017-03-10 LAB
ALBUMIN SERPL BCP-MCNC: 3.5 G/DL
ALP SERPL-CCNC: 254 U/L
ALT SERPL W/O P-5'-P-CCNC: 31 U/L
ANION GAP SERPL CALC-SCNC: 11 MMOL/L
AST SERPL-CCNC: 27 U/L
BASOPHILS # BLD AUTO: 0.02 K/UL
BASOPHILS NFR BLD: 0.4 %
BILIRUB SERPL-MCNC: 0.6 MG/DL
BUN SERPL-MCNC: 23 MG/DL
CALCIUM SERPL-MCNC: 9.4 MG/DL
CHLORIDE SERPL-SCNC: 105 MMOL/L
CO2 SERPL-SCNC: 24 MMOL/L
CREAT SERPL-MCNC: 1.1 MG/DL
DIFFERENTIAL METHOD: ABNORMAL
EOSINOPHIL # BLD AUTO: 0.3 K/UL
EOSINOPHIL NFR BLD: 6.3 %
ERYTHROCYTE [DISTWIDTH] IN BLOOD BY AUTOMATED COUNT: 13.4 %
EST. GFR  (AFRICAN AMERICAN): >60 ML/MIN/1.73 M^2
EST. GFR  (NON AFRICAN AMERICAN): >60 ML/MIN/1.73 M^2
GLUCOSE SERPL-MCNC: 145 MG/DL
HCT VFR BLD AUTO: 39.6 %
HGB BLD-MCNC: 12.8 G/DL
INR PPP: 1
LYMPHOCYTES # BLD AUTO: 0.8 K/UL
LYMPHOCYTES NFR BLD: 15.6 %
MCH RBC QN AUTO: 30.7 PG
MCHC RBC AUTO-ENTMCNC: 32.3 %
MCV RBC AUTO: 95 FL
MONOCYTES # BLD AUTO: 0.3 K/UL
MONOCYTES NFR BLD: 6.1 %
NEUTROPHILS # BLD AUTO: 3.8 K/UL
NEUTROPHILS NFR BLD: 71.6 %
PLATELET # BLD AUTO: 134 K/UL
PMV BLD AUTO: 12.3 FL
POTASSIUM SERPL-SCNC: 3.8 MMOL/L
PROT SERPL-MCNC: 7 G/DL
PROTHROMBIN TIME: 11.1 SEC
RBC # BLD AUTO: 4.17 M/UL
SODIUM SERPL-SCNC: 140 MMOL/L
WBC # BLD AUTO: 5.37 K/UL

## 2017-03-10 PROCEDURE — 63600175 PHARM REV CODE 636 W HCPCS: Performed by: SURGERY

## 2017-03-10 PROCEDURE — 63600175 PHARM REV CODE 636 W HCPCS: Performed by: STUDENT IN AN ORGANIZED HEALTH CARE EDUCATION/TRAINING PROGRAM

## 2017-03-10 PROCEDURE — 36248 INS CATH ABD/L-EXT ART ADDL: CPT

## 2017-03-10 PROCEDURE — 88307 TISSUE EXAM BY PATHOLOGIST: CPT | Performed by: PATHOLOGY

## 2017-03-10 PROCEDURE — 85025 COMPLETE CBC W/AUTO DIFF WBC: CPT

## 2017-03-10 PROCEDURE — 88360 TUMOR IMMUNOHISTOCHEM/MANUAL: CPT | Mod: 26,,, | Performed by: PATHOLOGY

## 2017-03-10 PROCEDURE — 80053 COMPREHEN METABOLIC PANEL: CPT

## 2017-03-10 PROCEDURE — B4141ZZ FLUOROSCOPY OF SUPERIOR MESENTERIC ARTERY USING LOW OSMOLAR CONTRAST: ICD-10-PCS | Performed by: RADIOLOGY

## 2017-03-10 PROCEDURE — 0FB13ZX EXCISION OF RIGHT LOBE LIVER, PERCUTANEOUS APPROACH, DIAGNOSTIC: ICD-10-PCS | Performed by: RADIOLOGY

## 2017-03-10 PROCEDURE — 25000003 PHARM REV CODE 250: Performed by: RADIOLOGY

## 2017-03-10 PROCEDURE — 04L33DZ OCCLUSION OF HEPATIC ARTERY WITH INTRALUMINAL DEVICE, PERCUTANEOUS APPROACH: ICD-10-PCS | Performed by: RADIOLOGY

## 2017-03-10 PROCEDURE — 25000003 PHARM REV CODE 250: Performed by: STUDENT IN AN ORGANIZED HEALTH CARE EDUCATION/TRAINING PROGRAM

## 2017-03-10 PROCEDURE — 36415 COLL VENOUS BLD VENIPUNCTURE: CPT

## 2017-03-10 PROCEDURE — B41B1ZZ FLUOROSCOPY OF OTHER INTRA-ABDOMINAL ARTERIES USING LOW OSMOLAR CONTRAST: ICD-10-PCS | Performed by: RADIOLOGY

## 2017-03-10 PROCEDURE — 11000001 HC ACUTE MED/SURG PRIVATE ROOM

## 2017-03-10 PROCEDURE — B4121ZZ FLUOROSCOPY OF HEPATIC ARTERY USING LOW OSMOLAR CONTRAST: ICD-10-PCS | Performed by: RADIOLOGY

## 2017-03-10 PROCEDURE — 3E05305 INTRODUCTION OF OTHER ANTINEOPLASTIC INTO PERIPHERAL ARTERY, PERCUTANEOUS APPROACH: ICD-10-PCS | Performed by: RADIOLOGY

## 2017-03-10 PROCEDURE — 85610 PROTHROMBIN TIME: CPT

## 2017-03-10 PROCEDURE — 88307 TISSUE EXAM BY PATHOLOGIST: CPT | Mod: 26,,, | Performed by: PATHOLOGY

## 2017-03-10 PROCEDURE — 25000003 PHARM REV CODE 250

## 2017-03-10 PROCEDURE — 25000003 PHARM REV CODE 250: Performed by: SURGERY

## 2017-03-10 PROCEDURE — 63600175 PHARM REV CODE 636 W HCPCS: Performed by: RADIOLOGY

## 2017-03-10 PROCEDURE — 88360 TUMOR IMMUNOHISTOCHEM/MANUAL: CPT | Performed by: PATHOLOGY

## 2017-03-10 PROCEDURE — 63600175 PHARM REV CODE 636 W HCPCS

## 2017-03-10 PROCEDURE — 88333 PATH CONSLTJ SURG CYTO XM 1: CPT | Mod: 26,,, | Performed by: PATHOLOGY

## 2017-03-10 PROCEDURE — 25500020 PHARM REV CODE 255: Performed by: SURGERY

## 2017-03-10 RX ORDER — FUROSEMIDE 10 MG/ML
20 INJECTION INTRAMUSCULAR; INTRAVENOUS ONCE
Status: COMPLETED | OUTPATIENT
Start: 2017-03-10 | End: 2017-03-10

## 2017-03-10 RX ORDER — FAMOTIDINE 20 MG/1
20 TABLET, FILM COATED ORAL EVERY 12 HOURS
Status: DISCONTINUED | OUTPATIENT
Start: 2017-03-10 | End: 2017-03-11 | Stop reason: HOSPADM

## 2017-03-10 RX ORDER — FENTANYL CITRATE 50 UG/ML
50 INJECTION, SOLUTION INTRAMUSCULAR; INTRAVENOUS ONCE
Status: DISCONTINUED | OUTPATIENT
Start: 2017-03-10 | End: 2017-03-10 | Stop reason: HOSPADM

## 2017-03-10 RX ORDER — FENTANYL CITRATE 50 UG/ML
INJECTION, SOLUTION INTRAMUSCULAR; INTRAVENOUS CODE/TRAUMA/SEDATION MEDICATION
Status: COMPLETED | OUTPATIENT
Start: 2017-03-10 | End: 2017-03-10

## 2017-03-10 RX ORDER — MIDAZOLAM HYDROCHLORIDE 1 MG/ML
0.5 INJECTION INTRAMUSCULAR; INTRAVENOUS ONCE
Status: DISCONTINUED | OUTPATIENT
Start: 2017-03-10 | End: 2017-03-10 | Stop reason: HOSPADM

## 2017-03-10 RX ORDER — DIPHENHYDRAMINE HYDROCHLORIDE 50 MG/ML
50 INJECTION INTRAMUSCULAR; INTRAVENOUS ONCE
Status: COMPLETED | OUTPATIENT
Start: 2017-03-10 | End: 2017-03-10

## 2017-03-10 RX ORDER — SODIUM CHLORIDE 9 MG/ML
500 INJECTION, SOLUTION INTRAVENOUS ONCE
Status: COMPLETED | OUTPATIENT
Start: 2017-03-10 | End: 2017-03-10

## 2017-03-10 RX ORDER — DEXAMETHASONE SODIUM PHOSPHATE 4 MG/ML
8 INJECTION, SOLUTION INTRA-ARTICULAR; INTRALESIONAL; INTRAMUSCULAR; INTRAVENOUS; SOFT TISSUE ONCE
Status: COMPLETED | OUTPATIENT
Start: 2017-03-10 | End: 2017-03-10

## 2017-03-10 RX ORDER — MANNITOL 250 MG/ML
12.5 INJECTION, SOLUTION INTRAVENOUS ONCE
Status: COMPLETED | OUTPATIENT
Start: 2017-03-10 | End: 2017-03-10

## 2017-03-10 RX ORDER — LIDOCAINE HYDROCHLORIDE 10 MG/ML
1 INJECTION, SOLUTION EPIDURAL; INFILTRATION; INTRACAUDAL; PERINEURAL ONCE
Status: DISCONTINUED | OUTPATIENT
Start: 2017-03-10 | End: 2017-03-10 | Stop reason: HOSPADM

## 2017-03-10 RX ORDER — ALLOPURINOL 300 MG/1
300 TABLET ORAL DAILY
Status: COMPLETED | OUTPATIENT
Start: 2017-03-10 | End: 2017-03-11

## 2017-03-10 RX ORDER — IBUPROFEN 400 MG/1
400 TABLET ORAL EVERY 4 HOURS PRN
Status: DISCONTINUED | OUTPATIENT
Start: 2017-03-10 | End: 2017-03-11 | Stop reason: HOSPADM

## 2017-03-10 RX ORDER — ONDANSETRON 2 MG/ML
4 INJECTION INTRAMUSCULAR; INTRAVENOUS EVERY 8 HOURS PRN
Status: DISCONTINUED | OUTPATIENT
Start: 2017-03-10 | End: 2017-03-10

## 2017-03-10 RX ORDER — MAGNESIUM SULFATE HEPTAHYDRATE 40 MG/ML
2 INJECTION, SOLUTION INTRAVENOUS ONCE
Status: COMPLETED | OUTPATIENT
Start: 2017-03-10 | End: 2017-03-10

## 2017-03-10 RX ORDER — DEXTROSE MONOHYDRATE, SODIUM CHLORIDE, AND POTASSIUM CHLORIDE 50; 1.49; 4.5 G/1000ML; G/1000ML; G/1000ML
INJECTION, SOLUTION INTRAVENOUS CONTINUOUS
Status: DISCONTINUED | OUTPATIENT
Start: 2017-03-10 | End: 2017-03-10 | Stop reason: SDUPTHER

## 2017-03-10 RX ORDER — PROMETHAZINE HYDROCHLORIDE 25 MG/ML
INJECTION, SOLUTION INTRAMUSCULAR; INTRAVENOUS CODE/TRAUMA/SEDATION MEDICATION
Status: COMPLETED | OUTPATIENT
Start: 2017-03-10 | End: 2017-03-10

## 2017-03-10 RX ORDER — MIDAZOLAM HYDROCHLORIDE 1 MG/ML
INJECTION, SOLUTION INTRAMUSCULAR; INTRAVENOUS CODE/TRAUMA/SEDATION MEDICATION
Status: COMPLETED | OUTPATIENT
Start: 2017-03-10 | End: 2017-03-10

## 2017-03-10 RX ORDER — OXYCODONE AND ACETAMINOPHEN 5; 325 MG/1; MG/1
1 TABLET ORAL EVERY 4 HOURS PRN
Status: DISCONTINUED | OUTPATIENT
Start: 2017-03-10 | End: 2017-03-11 | Stop reason: HOSPADM

## 2017-03-10 RX ORDER — ONDANSETRON 2 MG/ML
4 INJECTION INTRAMUSCULAR; INTRAVENOUS EVERY 6 HOURS PRN
Status: DISCONTINUED | OUTPATIENT
Start: 2017-03-10 | End: 2017-03-11 | Stop reason: HOSPADM

## 2017-03-10 RX ORDER — OXYCODONE AND ACETAMINOPHEN 5; 325 MG/1; MG/1
1 TABLET ORAL EVERY 6 HOURS PRN
Status: DISCONTINUED | OUTPATIENT
Start: 2017-03-10 | End: 2017-03-10

## 2017-03-10 RX ORDER — DEXTROSE MONOHYDRATE, SODIUM CHLORIDE, AND POTASSIUM CHLORIDE 50; 1.49; 4.5 G/1000ML; G/1000ML; G/1000ML
INJECTION, SOLUTION INTRAVENOUS CONTINUOUS
Status: DISCONTINUED | OUTPATIENT
Start: 2017-03-10 | End: 2017-03-11 | Stop reason: HOSPADM

## 2017-03-10 RX ADMIN — ALLOPURINOL 300 MG: 300 TABLET ORAL at 03:03

## 2017-03-10 RX ADMIN — MANNITOL 12.5 G: 12.5 INJECTION, SOLUTION INTRAVENOUS at 02:03

## 2017-03-10 RX ADMIN — OXYCODONE HYDROCHLORIDE AND ACETAMINOPHEN 1 TABLET: 5; 325 TABLET ORAL at 08:03

## 2017-03-10 RX ADMIN — FAMOTIDINE 20 MG: 20 TABLET ORAL at 08:03

## 2017-03-10 RX ADMIN — DEXTROSE MONOHYDRATE, SODIUM CHLORIDE, AND POTASSIUM CHLORIDE: 50; 4.5; 1.49 INJECTION, SOLUTION INTRAVENOUS at 11:03

## 2017-03-10 RX ADMIN — MITOMYCIN: 5 INJECTION, POWDER, LYOPHILIZED, FOR SOLUTION INTRAVENOUS at 02:03

## 2017-03-10 RX ADMIN — PROMETHAZINE HYDROCHLORIDE 6.25 MG: 25 INJECTION INTRAMUSCULAR; INTRAVENOUS at 08:03

## 2017-03-10 RX ADMIN — DEXAMETHASONE SODIUM PHOSPHATE 8 MG: 4 INJECTION, SOLUTION INTRAMUSCULAR; INTRAVENOUS at 04:03

## 2017-03-10 RX ADMIN — OXYCODONE HYDROCHLORIDE AND ACETAMINOPHEN 1 TABLET: 5; 325 TABLET ORAL at 04:03

## 2017-03-10 RX ADMIN — ONDANSETRON HYDROCHLORIDE 16 MG: 2 SOLUTION INTRAMUSCULAR; INTRAVENOUS at 10:03

## 2017-03-10 RX ADMIN — SODIUM CHLORIDE 500 ML: 0.9 INJECTION, SOLUTION INTRAVENOUS at 10:03

## 2017-03-10 RX ADMIN — SODIUM CHLORIDE 3 G: 9 INJECTION, SOLUTION INTRAVENOUS at 11:03

## 2017-03-10 RX ADMIN — FENTANYL CITRATE 50 MCG: 50 INJECTION, SOLUTION INTRAMUSCULAR; INTRAVENOUS at 01:03

## 2017-03-10 RX ADMIN — PROMETHAZINE HYDROCHLORIDE 12.5 MG: 25 INJECTION INTRAMUSCULAR; INTRAVENOUS at 02:03

## 2017-03-10 RX ADMIN — FUROSEMIDE 20 MG: 10 INJECTION, SOLUTION INTRAMUSCULAR; INTRAVENOUS at 04:03

## 2017-03-10 RX ADMIN — SODIUM CHLORIDE 3 G: 9 INJECTION, SOLUTION INTRAVENOUS at 05:03

## 2017-03-10 RX ADMIN — MAGNESIUM SULFATE IN WATER 2 G: 40 INJECTION, SOLUTION INTRAVENOUS at 11:03

## 2017-03-10 RX ADMIN — PROMETHAZINE HYDROCHLORIDE 12.5 MG: 25 INJECTION INTRAMUSCULAR; INTRAVENOUS at 01:03

## 2017-03-10 RX ADMIN — SODIUM CHLORIDE 3 G: 9 INJECTION, SOLUTION INTRAVENOUS at 10:03

## 2017-03-10 RX ADMIN — MIDAZOLAM 1 MG: 1 INJECTION INTRAMUSCULAR; INTRAVENOUS at 01:03

## 2017-03-10 RX ADMIN — OCTREOTIDE ACETATE 125 MCG/HR: 1000 INJECTION, SOLUTION INTRAVENOUS; SUBCUTANEOUS at 06:03

## 2017-03-10 RX ADMIN — OCTREOTIDE ACETATE 500 MCG/HR: 1000 INJECTION, SOLUTION INTRAVENOUS; SUBCUTANEOUS at 11:03

## 2017-03-10 RX ADMIN — DEXTROSE MONOHYDRATE, SODIUM CHLORIDE, AND POTASSIUM CHLORIDE: 50; 4.5; 1.49 INJECTION, SOLUTION INTRAVENOUS at 08:03

## 2017-03-10 RX ADMIN — MIDAZOLAM 1 MG: 1 INJECTION INTRAMUSCULAR; INTRAVENOUS at 02:03

## 2017-03-10 RX ADMIN — OCTREOTIDE ACETATE 500 MCG: 500 INJECTION, SOLUTION INTRAVENOUS; SUBCUTANEOUS at 10:03

## 2017-03-10 RX ADMIN — ONDANSETRON HYDROCHLORIDE 4 MG: 2 SOLUTION INTRAMUSCULAR; INTRAVENOUS at 04:03

## 2017-03-10 RX ADMIN — FAMOTIDINE 20 MG: 20 TABLET ORAL at 03:03

## 2017-03-10 RX ADMIN — DIPHENHYDRAMINE HYDROCHLORIDE 50 MG: 50 INJECTION, SOLUTION INTRAMUSCULAR; INTRAVENOUS at 11:03

## 2017-03-10 RX ADMIN — FENTANYL CITRATE 50 MCG: 50 INJECTION, SOLUTION INTRAMUSCULAR; INTRAVENOUS at 02:03

## 2017-03-10 NOTE — PROCEDURES
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  A microcatheter was introduced into feeding arteries of a right hepatic lobe tumor and a combination of gelfoam slurry, doxorubicin and mitomycin were administered until near stagnant flow was achieved.  Post procedural angiography revealed no complications.    Right femoral artery angiogram was performed and the sheath was removed.  Hemostasis was achieved using Exoseal technique.  There was no hematoma at the time of hemostasis.    The patient tolerated procedure well.  Please see Imaging report for further details.    Jayme Tinsley M.D.  Diagnostic and Interventional Radiologist  Department of Radiology  Pager: [phone]

## 2017-03-10 NOTE — PROCEDURES
Radiology Post-Procedure Note    Pre Op Diagnosis: right liver mass    Post Op Diagnosis: right liver mass    Procedure: right liver biopsy    Procedure performed by: Jayme Tinsley MD    Written Informed Consent Obtained: Yes    Specimen Removed: YES 5 x 18 gauge cores    Estimated Blood Loss: Minimal    Findings:   Using US guidance a 17g sheath needle was placed into a right liver mass. 18g monopty biopsy gun used to take 5 core biopsy samples. Specimen sent to pathology.     Patient tolerated procedure well.    Jayme Tinsley M.D.  Diagnostic and Interventional Radiologist  Department of Radiology  Pager: [phone]

## 2017-03-10 NOTE — NURSING TRANSFER
Nursing Transfer Note      3/10/2017     Transfer 528/interventional radiology  Transfer via stretcher  Transfer with belonging bag  Transported by Steffany Clifford RN  Medicines sent: N/A  Chart send with patient: YES Notified:yes  Patient reassessed at:1515 3/10/17    Upon arrival to floor:1515

## 2017-03-10 NOTE — IP AVS SNAPSHOT
[facility]  [address]  [place] [address]  Phone: [phone]           Patient Discharge Instructions     Our goal is to set you up for success. This packet includes information on your condition, medications, and your home care. It will help you to care for yourself so you don't get sicker and need to go back to the hospital.     Please ask your nurse if you have any questions.        There are many details to remember when preparing to leave the hospital. Here is what you will need to do:    1. Take your medicine. If you are prescribed medications, review your Medication List in the following pages. You may have new medications to  at the pharmacy and others that you'll need to stop taking. Review the instructions for how and when to take your medications. Talk with your doctor or nurses if you are unsure of what to do.     2. Go to your follow-up appointments. Specific follow-up information is listed in the following pages. Your may be contacted by a transition nurse or clinical provider about future appointments. Be sure we have all of the phone numbers to reach you, if needed. Please contact your provider's office if you are unable to make an appointment.     3. Watch for warning signs. Your doctor or nurse will give you detailed warning signs to watch for and when to call for assistance. These instructions may also include educational information about your condition. If you experience any of warning signs to your health, call your doctor.               ** Verify the list of medication(s) below is accurate and up to date. Carry this with you in case of emergency. If your medications have changed, please notify your healthcare provider.             Medication List      START taking these medications        Additional Info                      ciprofloxacin HCl 500 MG tablet   Commonly known as:  CIPRO   Quantity:  14 tablet   Refills:  0   Dose:  500 mg    Instructions:  Take 1  tablet (500 mg total) by mouth 2 (two) times daily.     Begin Date    AM    Noon    PM    Bedtime       magnesium hydroxide 311 MG Chew   Commonly known as:  MARC CHEWS   Quantity:  30 tablet   Refills:  1   Dose:  311 mg    Instructions:  Take 1 tablet (311 mg total) by mouth 2 (two) times daily as needed.     Begin Date    AM    Noon    PM    Bedtime       ondansetron 4 MG Tbdl   Commonly known as:  ZOFRAN-ODT   Quantity:  20 tablet   Refills:  0   Dose:  4 mg    Instructions:  Take 1 tablet (4 mg total) by mouth every 6 (six) hours as needed.     Begin Date    AM    Noon    PM    Bedtime       tramadol 50 mg tablet   Commonly known as:  ULTRAM   Quantity:  30 tablet   Refills:  0   Dose:  50 mg    Instructions:  Take 1 tablet (50 mg total) by mouth every 6 (six) hours as needed for Pain.     Begin Date    AM    Noon    PM    Bedtime         CHANGE how you take these medications        Additional Info                      CREON 24,000-76,000 -120,000 unit capsule   Quantity:  900 capsule   Refills:  3   Dose:  2 capsule   What changed:  when to take this   Generic drug:  lipase-protease-amylase 24,000-76,000-120,000 units    Instructions:  Take 2 capsules by mouth 4 (four) times daily with meals and nightly.     Begin Date    AM    Noon    PM    Bedtime         CONTINUE taking these medications        Additional Info                      aspirin 81 MG EC tablet   Commonly known as:  ECOTRIN   Refills:  0    Instructions:  Take by mouth.     Begin Date    AM    Noon    PM    Bedtime       atorvastatin 10 MG tablet   Commonly known as:  LIPITOR   Refills:  0   Dose:  10 mg    Instructions:  10 mg every evening.     Begin Date    AM    Noon    PM    Bedtime       cholecalciferol (vitamin D3) 2,000 unit Tab   Refills:  0   Dose:  1 tablet    Instructions:  Take 1 tablet by mouth once daily.     Begin Date    AM    Noon    PM    Bedtime       cholestyramine 4 gram packet   Commonly known as:  QUESTRAN   Refills:  0     Instructions:  TAKE 1 PACKET TWICE DAILY IN WATER     Begin Date    AM    Noon    PM    Bedtime       cinnamon bark 500 mg capsule   Refills:  0    Instructions:  Take by mouth.     Begin Date    AM    Noon    PM    Bedtime       co-enzyme Q-10 30 mg capsule   Refills:  0   Dose:  30 mg    Instructions:  Take 30 mg by mouth once daily.     Begin Date    AM    Noon    PM    Bedtime       lactulose 10 gram/15 mL solution   Commonly known as:  CHRONULAC   Refills:  0   Dose:  10 g   Indications:  Constipation    Instructions:  Take 10 g by mouth 3 (three) times daily.     Begin Date    AM    Noon    PM    Bedtime       lanreotide 120 mg/0.5 mL Syrg   Commonly known as:  SOMATULINE DEPOT   Refills:  0   Dose:  120 mg    Instructions:  Inject 120 mg into the skin every 28 days.     Begin Date    AM    Noon    PM    Bedtime       levothyroxine 175 MCG tablet   Commonly known as:  SYNTHROID, LEVOTHROID   Refills:  8   Dose:  175 mcg    Instructions:  Take 175 mcg by mouth once daily.     Begin Date    AM    Noon    PM    Bedtime       metformin 850 MG tablet   Commonly known as:  GLUCOPHAGE   Refills:  3   Dose:  850 mg    Instructions:  Take 850 mg by mouth 2 (two) times daily with meals.     Begin Date    AM    Noon    PM    Bedtime       multivitamin per tablet   Commonly known as:  THERAGRAN   Refills:  0   Dose:  1 tablet    Instructions:  Take 1 tablet by mouth once daily.     Begin Date    AM    Noon    PM    Bedtime       nateglinide 60 MG tablet   Commonly known as:  STARLIX   Refills:  0   Dose:  60 mg    Instructions:  Take 60 mg by mouth 3 (three) times daily before meals.     Begin Date    AM    Noon    PM    Bedtime       ramipril 10 MG capsule   Commonly known as:  ALTACE   Refills:  0    Instructions:  2 (two) times daily.     Begin Date    AM    Noon    PM    Bedtime       saw palmetto fruit 450 mg Cap   Refills:  0   Dose:  1 capsule    Instructions:  Take 1 capsule by mouth 2 (two) times daily.      Begin Date    AM    Noon    PM    Bedtime       temazepam 15 mg Cap   Commonly known as:  RESTORIL   Refills:  0   Dose:  15 mg    Instructions:  15 mg every evening.     Begin Date    AM    Noon    PM    Bedtime       terazosin 1 MG capsule   Commonly known as:  HYTRIN   Refills:  0   Dose:  1 mg    Instructions:  1 mg every evening.     Begin Date    AM    Noon    PM    Bedtime       vitamin A 8000 UNIT capsule   Refills:  0   Dose:  8000 Units    Instructions:  Take 8,000 Units by mouth.     Begin Date    AM    Noon    PM    Bedtime            Where to Get Your Medications      You can get these medications from any pharmacy     Bring a paper prescription for each of these medications     ciprofloxacin HCl 500 MG tablet    magnesium hydroxide 311 MG Chew    ondansetron 4 MG Tbdl    tramadol 50 mg tablet                  Please bring to all follow up appointments:    1. A copy of your discharge instructions.  2. All medicines you are currently taking in their original bottles.  3. Identification and insurance card.    Please arrive 15 minutes ahead of scheduled appointment time.    Please call 24 hours in advance if you must reschedule your appointment and/or time.        Follow-up Information     Follow up with Bogdan Waterman MD In 3 months.    Specialty:  General Surgery    Why:        Contact information:    [address] [facility] [address]  [place] [address]  [phone]          Discharge Instructions     Future Orders    Activity as tolerated     Call MD for:  persistent nausea and vomiting or diarrhea     Call MD for:  redness, tenderness, or signs of infection (pain, swelling, redness, odor or green/yellow discharge around incision site)     Call MD for:  severe uncontrolled pain     Call MD for:  temperature >100.4     Diet general     Questions:    Total calories:      Fat restriction, if any:      Protein restriction, if any:      Na restriction, if any:      Fluid restriction:      Additional  "restrictions:      No dressing needed         Admission Information     Date & Time Provider Department CSN    3/10/2017  9:15 AM Dwight Braun MD [facility] [id number]      Care Providers     Provider Role Specialty Primary office phone    Dwight Braun MD Attending Provider General Surgery [phone]    [facility] Diagnostic Provider Surgeon  -- Number not on file      Your Vitals Were     BP Pulse Temp Resp Height Weight    165/92 (BP Location: Right arm, Patient Position: Lying, BP Method: Automatic) 46 98.6 °F (37 °C) (Oral) 18 5' 9" (1.753 m) 83.9 kg (185 lb)    SpO2 BMI             95% 27.32 kg/m2         Recent Lab Values     No lab values to display.      Pending Labs     Order Current Status    Cytology Specimen-FNA-Radiology Assisted with Path Adequacy-Core BX Collected (03/10/17 1400)      Allergies as of 3/11/2017        Reactions    Epinephrine Anaphylaxis    Can cause Carcinoid Crisis    Latex, Natural Rubber Rash      [facility]     [facility] Nurse Care Line - 24/7 Assistance  Unless otherwise directed by your provider, please contact [facility], our nurse care line that is available for 24/7 assistance.     Registered nurses in the [facility] provide clinical advisement, health education, appointment booking, and other advisory services.  Call for this free service at [phone].        Advance Directives     An advance directive is a document which, in the event you are no longer able to make decisions for yourself, tells your healthcare team what kind of treatment you do or do not want to receive, or who you would like to make those decisions for you.  If you do not currently have an advance directive, [facility] encourages you to create one.  For more information call:  [phone] ([phone]),  or log on to [email or web address].        Language Assistance Services     ATTENTION: Language assistance " services are available, free of charge. Please call [phone].      ATENCIÓN: Si habla abhishek, tiene a mosquera disposición servicios gratuitos de asistencia lingüística. Llame al [phone].     CHÚ Ý: N?u b?n nói Ti?ng Vi?t, có các d?ch v? h? tr? ngôn ng? mi?n phí dành cho b?n. G?i s? [phone].         [facility] complies with applicable Federal civil rights laws and does not discriminate on the basis of race, color, national origin, age, disability, or sex.

## 2017-03-10 NOTE — H&P
Radiology History & Physical      SUBJECTIVE:     Chief Complaint: metastatic NET    History of Present Illness:  Delmar Mckee is a 81 y.o. male who presents for TACE #2 to [facility] and liver biopsy.  Past Medical History:   Diagnosis Date    Diabetes mellitus type II, controlled     Drug therapy     lanreotide 6/2016    HTN (hypertension) 2016    Hypothyroidism     Malignant carcinoid tumor of ileum 2009     Past Surgical History:   Procedure Laterality Date    ABDOMINAL SURGERY  2009    bland embo  2012, 2013    drug therapy      sandostatin 30 mg every 28 days    drug trial  2015 summer    pazopanib    nanoknofe  2011    RFA  2011    Pt denies having in 2012, 2013 (7/26/16)       Home Meds:   Prior to Admission medications    Medication Sig Start Date End Date Taking? Authorizing Provider   aspirin (ECOTRIN) 81 MG EC tablet Take by mouth.    Historical Provider, MD   atorvastatin (LIPITOR) 10 MG tablet 10 mg every evening.  7/12/16   Historical Provider, MD   cholecalciferol, vitamin D3, 2,000 unit Tab Take 1 tablet by mouth once daily.     Historical Provider, MD   cholestyramine (QUESTRAN) 4 gram packet TAKE 1 PACKET TWICE DAILY IN WATER 7/24/16   Historical Provider, MD   cinnamon bark 500 mg capsule Take by mouth.    Historical Provider, MD   co-enzyme Q-10 30 mg capsule Take 30 mg by mouth once daily.     Historical Provider, MD   CREON 24,000-76,000 -120,000 unit capsule Take 2 capsules by mouth 4 (four) times daily with meals and nightly.  Patient taking differently: Take 2 capsules by mouth 3 (three) times daily with meals.  1/17/17   Bogdan Waterman MD   lactulose (CHRONULAC) 10 gram/15 mL solution Take 10 g by mouth 3 (three) times daily.    Historical Provider, MD   lanreotide (SOMATULINE DEPOT) 120 mg/0.5 mL Syrg Inject 120 mg into the skin every 28 days.    Historical Provider, MD   levothyroxine (SYNTHROID, LEVOTHROID) 175 MCG tablet Take 175 mcg by mouth once daily. 5/2/16    Historical Provider, MD   metformin (GLUCOPHAGE) 850 MG tablet Take 850 mg by mouth 2 (two) times daily with meals.  7/6/16   Historical Provider, MD   multivitamin (THERAGRAN) per tablet Take 1 tablet by mouth once daily.     Historical Provider, MD   nateglinide (STARLIX) 60 MG tablet Take 60 mg by mouth 3 (three) times daily before meals.    Historical Provider, MD   ramipril (ALTACE) 10 MG capsule 2 (two) times daily.  7/9/16   Historical Provider, MD   saw palmetto fruit 450 mg Cap Take 1 capsule by mouth 2 (two) times daily.     Historical Provider, MD   temazepam (RESTORIL) 15 mg Cap 15 mg every evening.  7/9/16   Historical Provider, MD   terazosin (HYTRIN) 1 MG capsule 1 mg every evening.  7/12/16   Historical Provider, MD   vitamin A 8000 UNIT capsule Take 8,000 Units by mouth.    Historical Provider, MD     Anticoagulants/Antiplatelets: aspirin    Allergies:   Review of patient's allergies indicates:   Allergen Reactions    Epinephrine Anaphylaxis     Can cause Carcinoid Crisis    Latex, natural rubber Rash     Sedation History:  no adverse reactions    Review of Systems:   Hematological: no known coagulopathies  Respiratory: no shortness of breath  Cardiovascular: no chest pain  Gastrointestinal: no abdominal pain  Genito-Urinary: no dysuria  Musculoskeletal: negative  Neurological: no TIA or stroke symptoms         OBJECTIVE:     Vital Signs (Most Recent)       Physical Exam:  ASA: 2  Mallampati: 2    General: no acute distress  Mental Status: alert and oriented to person, place and time  HEENT: normocephalic, atraumatic  Chest: unlabored breathing  Heart: regular heart rate  Abdomen: nondistended  Extremity: moves all extremities    Laboratory  Lab Results   Component Value Date    INR 1.0 09/22/2016       Lab Results   Component Value Date    WBC 11.34 09/24/2016    HGB 13.1 (L) 09/24/2016    HCT 40.3 09/24/2016    MCV 93 09/24/2016     09/24/2016      Lab Results   Component Value Date      (H) 09/24/2016     09/24/2016    K 3.6 09/24/2016     09/24/2016    CO2 24 09/24/2016    BUN 17 09/24/2016    CREATININE 1.2 09/24/2016    CALCIUM 9.5 09/24/2016    ALT 25 09/24/2016    AST 40 09/24/2016    ALBUMIN 3.7 09/24/2016    BILITOT 0.5 09/24/2016       ASSESSMENT/PLAN:     Sedation Plan: Moderate  Patient will undergo US guided liver mass biopsy of liver lesion and TACE #2 to [facility] distribution.    Jayme Tinsley M.D.  Diagnostic and Interventional Radiologist  Department of Radiology  Pager: [phone]

## 2017-03-11 VITALS
OXYGEN SATURATION: 95 % | WEIGHT: 185 LBS | DIASTOLIC BLOOD PRESSURE: 91 MMHG | SYSTOLIC BLOOD PRESSURE: 175 MMHG | RESPIRATION RATE: 18 BRPM | TEMPERATURE: 98 F | BODY MASS INDEX: 27.4 KG/M2 | HEIGHT: 69 IN | HEART RATE: 46 BPM

## 2017-03-11 PROCEDURE — 25000003 PHARM REV CODE 250: Performed by: STUDENT IN AN ORGANIZED HEALTH CARE EDUCATION/TRAINING PROGRAM

## 2017-03-11 PROCEDURE — 63600175 PHARM REV CODE 636 W HCPCS: Performed by: SURGERY

## 2017-03-11 PROCEDURE — 94761 N-INVAS EAR/PLS OXIMETRY MLT: CPT

## 2017-03-11 PROCEDURE — 63600175 PHARM REV CODE 636 W HCPCS: Performed by: STUDENT IN AN ORGANIZED HEALTH CARE EDUCATION/TRAINING PROGRAM

## 2017-03-11 PROCEDURE — 25000003 PHARM REV CODE 250: Performed by: SURGERY

## 2017-03-11 RX ORDER — TRAMADOL HYDROCHLORIDE 50 MG/1
50 TABLET ORAL EVERY 6 HOURS PRN
Qty: 30 TABLET | Refills: 0 | Status: ON HOLD | OUTPATIENT
Start: 2017-03-11 | End: 2017-07-15

## 2017-03-11 RX ORDER — ONDANSETRON 4 MG/1
4 TABLET, ORALLY DISINTEGRATING ORAL EVERY 6 HOURS PRN
Qty: 20 TABLET | Refills: 0 | Status: SHIPPED | OUTPATIENT
Start: 2017-03-11 | End: 2017-03-20 | Stop reason: SDUPTHER

## 2017-03-11 RX ORDER — CIPROFLOXACIN 500 MG/1
500 TABLET ORAL 2 TIMES DAILY
Qty: 14 TABLET | Refills: 0 | Status: SHIPPED | OUTPATIENT
Start: 2017-03-11 | End: 2017-03-18

## 2017-03-11 RX ADMIN — OXYCODONE HYDROCHLORIDE AND ACETAMINOPHEN 1 TABLET: 5; 325 TABLET ORAL at 09:03

## 2017-03-11 RX ADMIN — OXYCODONE HYDROCHLORIDE AND ACETAMINOPHEN 1 TABLET: 5; 325 TABLET ORAL at 03:03

## 2017-03-11 RX ADMIN — SODIUM CHLORIDE 3 G: 9 INJECTION, SOLUTION INTRAVENOUS at 04:03

## 2017-03-11 RX ADMIN — ALLOPURINOL 300 MG: 300 TABLET ORAL at 09:03

## 2017-03-11 RX ADMIN — DEXTROSE MONOHYDRATE, SODIUM CHLORIDE, AND POTASSIUM CHLORIDE: 50; 4.5; 1.49 INJECTION, SOLUTION INTRAVENOUS at 03:03

## 2017-03-11 RX ADMIN — FAMOTIDINE 20 MG: 20 TABLET ORAL at 09:03

## 2017-03-11 RX ADMIN — ONDANSETRON 4 MG: 2 INJECTION INTRAMUSCULAR; INTRAVENOUS at 04:03

## 2017-03-11 NOTE — PROGRESS NOTES
Patient transferred to unit per stretcher per transport. Patient dressing clean, dry and intact. Patient awake, alert and oriented. Call bell within reach. Bed in low position. Bed locked.

## 2017-03-11 NOTE — PLAN OF CARE
Discharge orders noted. No HH or DME.       03/11/17 1108   Final Note   Assessment Type Final Discharge Note   Discharge Disposition Home   What phone number can be called within the next 1-3 days to see how you are doing after discharge? [phone]   Hospital Follow Up  Appt(s) scheduled? No  (offices closed on weekend, [place] to follow up)   Offered [facility] -- Bedside Delivery? n/a  (not available on weekend)   Discharge/Hospital Encounter Summary to ([facility]) PCP Yes   Referral to Outpatient Case Management complete? n/a   Referral to / orders for Home Health Complete? n/a   30 day supply of medicines given at discharge, if documented non-compliance / non-adherence? n/a   Any social issues identified prior to discharge? n/a   Did you assess the readiness or willingness of the family or caregiver to support self management of care? n/a   Right Care Referral Info   Post Acute Recommendation No Care     Digna Singh, RN Transitional Navigator  [phone]

## 2017-03-11 NOTE — PROGRESS NOTES
Progress Note    Admit Date: 3/10/2017   LOS: 1 day     SUBJECTIVE:     POD1 s/p TACE  NAEON, Feeling well. Pain controlled. No nausea or vomiting.     Scheduled Meds:   ampicillin-sulbactim (UNASYN) IVPB  3 g Intravenous Q6H    famotidine  20 mg Oral Q12H     Continuous Infusions:   dextrose 5 % and 0.45 % NaCl with KCl 20 mEq 125 mL/hr at 03/11/17 0359    octreotide infusion (50 mcg/mL) 125 mcg/hr (03/10/17 1800)     PRN Meds:ibuprofen, ondansetron, oxycodone-acetaminophen, promethazine (PHENERGAN) IVPB    Review of patient's allergies indicates:   Allergen Reactions    Epinephrine Anaphylaxis     Can cause Carcinoid Crisis    Latex, natural rubber Rash       OBJECTIVE:     Vital Signs (Most Recent)  Temp: 98.6 °F (37 °C) (03/11/17 0800)  Pulse: (!) 46 (03/11/17 0800)  Resp: 18 (03/11/17 0800)  BP: (!) 165/92 (03/11/17 0800)  SpO2: 95 % (03/11/17 0357)    Vital Signs Range (Last 24H):  Temp:  [97.1 °F (36.2 °C)-98.6 °F (37 °C)]   Pulse:  [40-69]   Resp:  [14-18]   BP: (126-192)/()   SpO2:  [92 %-100 %]     I & O (Last 24H):  Intake/Output Summary (Last 24 hours) at 03/11/17 1024  Last data filed at 03/11/17 0700   Gross per 24 hour   Intake          1558.33 ml   Output             2615 ml   Net         -1056.67 ml     Physical Exam:  NAD AAO  CTA   RRR  abd soft, NT, ND  Puncture site hemostatic    Laboratory:  CBC:   Recent Labs  Lab 03/10/17  0931   WBC 5.37   RBC 4.17*   HGB 12.8*   HCT 39.6*   *   MCV 95   MCH 30.7   MCHC 32.3     CMP:   Recent Labs  Lab 03/10/17  0931   *   CALCIUM 9.4   ALBUMIN 3.5   PROT 7.0      K 3.8   CO2 24      BUN 23   CREATININE 1.1   ALKPHOS 254*   ALT 31   AST 27   BILITOT 0.6         ASSESSMENT/PLAN:     POD 1 s/p TACE  - Reg diet  - home today  - fu with dr Watermna as directed

## 2017-03-11 NOTE — PLAN OF CARE
Problem: Patient Care Overview  Goal: Plan of Care Review  Outcome: Ongoing (interventions implemented as appropriate)  Patient resting in bed, AAOx4. IVF infusing as ordered. Medications administered as ordered. Patient complained of pain and nausea early in shift, PRN medications administered. Patient asleep for majority of rest of night. Patient ambulated in room independently, safety maintained. R groin incision site CDI. Encouraged to call with needs or concerns. Will continue to monitor.

## 2017-03-12 NOTE — DISCHARGE SUMMARY
[facility]  Discharge Summary      Admit Date: 3/10/2017    Discharge Date and Time: 3/11/2017  2:58 PM    Attending Physician: No att. 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 He tolerated the procedure well, and did well post op. He had uncomplicated stay, and was discharged on 3/11.     Plan: F/u with Dr Waterman in 3 months with repeat imaging for restaging      Consults: none    Significant Diagnostic Studies: Labs:   CMP   Recent Labs  Lab 03/10/17  0931      K 3.8      CO2 24   *   BUN 23   CREATININE 1.1   CALCIUM 9.4   PROT 7.0   ALBUMIN 3.5   BILITOT 0.6   ALKPHOS 254*   AST 27   ALT 31   ANIONGAP 11   ESTGFRAFRICA >60   EGFRNONAA >60    and CBC   Recent Labs  Lab 03/10/17  0931   WBC 5.37   HGB 12.8*   HCT 39.6*   *       Final Diagnoses:    Principal Problem: <principal problem not specified>   Secondary Diagnoses:   Active Hospital Problems    Diagnosis  POA    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       Discharged Condition: good    Disposition: Home or Self Care    Follow Up/Patient Instructions:     Medications:  Reconciled Home Medications:   Discharge Medication List as of 3/11/2017 10:43 AM      START taking these medications    Details   ciprofloxacin HCl (CIPRO) 500 MG tablet Take 1 tablet (500 mg total) by mouth 2 (two) times daily., Starting 3/11/2017, Until Sat 3/18/17, Print      magnesium hydroxide (MARC CHEWS) 311 MG Chew Take 1 tablet (311 mg total) by mouth 2 (two) times daily as needed., Starting 3/11/2017, Until Sun 3/11/18, Print       ondansetron (ZOFRAN-ODT) 4 MG TbDL Take 1 tablet (4 mg total) by mouth every 6 (six) hours as needed., Starting 3/11/2017, Until Discontinued, Print      tramadol (ULTRAM) 50 mg tablet Take 1 tablet (50 mg total) by mouth every 6 (six) hours as needed for Pain., Starting 3/11/2017, Until Discontinued, Print         CONTINUE these medications which have NOT CHANGED    Details   aspirin (ECOTRIN) 81 MG EC tablet Take by mouth., Until Discontinued, Historical Med      atorvastatin (LIPITOR) 10 MG tablet 10 mg every evening. , Starting 7/12/2016, Until Discontinued, Historical Med      cholecalciferol, vitamin D3, 2,000 unit Tab Take 1 tablet by mouth once daily. , Until Discontinued, Historical Med      cholestyramine (QUESTRAN) 4 gram packet TAKE 1 PACKET TWICE DAILY IN WATER, Historical Med      cinnamon bark 500 mg capsule Take by mouth., Until Discontinued, Historical Med      co-enzyme Q-10 30 mg capsule Take 30 mg by mouth once daily. , Until Discontinued, Historical Med      CREON 24,000-76,000 -120,000 unit capsule Take 2 capsules by mouth 4 (four) times daily with meals and nightly., Starting 1/17/2017, Until Discontinued, Normal      lactulose (CHRONULAC) 10 gram/15 mL solution Take 10 g by mouth 3 (three) times daily., Until Discontinued, Historical Med      lanreotide (SOMATULINE DEPOT) 120 mg/0.5 mL Syrg Inject 120 mg into the skin every 28 days., Until Discontinued, Historical Med      levothyroxine (SYNTHROID, LEVOTHROID) 175 MCG tablet Take 175 mcg by mouth once daily., Starting 5/2/2016, Until Discontinued, Historical Med      metformin (GLUCOPHAGE) 850 MG tablet Take 850 mg by mouth 2 (two) times daily with meals. , Starting 7/6/2016, Until Discontinued, Historical Med      multivitamin (THERAGRAN) per tablet Take 1 tablet by mouth once daily. , Until Discontinued, Historical Med      nateglinide (STARLIX) 60 MG tablet Take 60 mg by mouth 3 (three) times daily before meals., Until Discontinued,  Historical Med      ramipril (ALTACE) 10 MG capsule 2 (two) times daily. , Starting 7/9/2016, Until Discontinued, Historical Med      saw palmetto fruit 450 mg Cap Take 1 capsule by mouth 2 (two) times daily. , Until Discontinued, Historical Med      temazepam (RESTORIL) 15 mg Cap 15 mg every evening. , Starting 7/9/2016, Until Discontinued, Historical Med      terazosin (HYTRIN) 1 MG capsule 1 mg every evening. , Starting 7/12/2016, Until Discontinued, Historical Med      vitamin A 8000 UNIT capsule Take 8,000 Units by mouth., Until Discontinued, Historical Med             Discharge Procedure Orders  Diet general     Activity as tolerated     Call MD for:  temperature >100.4     Call MD for:  persistent nausea and vomiting or diarrhea     Call MD for:  severe uncontrolled pain     Call MD for:  redness, tenderness, or signs of infection (pain, swelling, redness, odor or green/yellow discharge around incision site)     No dressing needed       Follow-up Information     Follow up with Bogdan Waterman MD In 3 months.    Specialty:  General Surgery    Why:        Contact information:    [address] [facility] [address]  [place] [address] [phone]

## 2017-03-14 ENCOUNTER — TELEPHONE (OUTPATIENT)
Dept: NEUROLOGY | Facility: HOSPITAL | Age: 82
End: 2017-03-14

## 2017-03-14 NOTE — TELEPHONE ENCOUNTER
----- Message from Jayme Tinsley MD sent at 3/10/2017  6:07 PM CST -----  This pt should get follow up in 1 month, preferably MRI. Given that his tumors continued growing despite 1 round of TACE, if he doesn't respond I think he should be started on systemic therapy sooner rather than later. There was a 6 month gap between his first and second TACE with a few studies in between. I'm hopeful he will get a better response this time and hopefully we can retreat him in 2 months.    Jayme

## 2017-03-14 NOTE — TELEPHONE ENCOUNTER
Received call back from pt. Discussed Dr. Tinsley recommendation of MRI 1 month after TACE to determine if disease has progressed more after TACE and if he will need to be treated with systemic therapy. Pt verbalizes understanding and requests that orders be mailed to his house in [place], [place].

## 2017-03-14 NOTE — TELEPHONE ENCOUNTER
Called pt to inform that per Dr. Tinsley, pt should get MRI 1 month after TACE due to if he is not responding from TACE, he should start systemic therapy. No answer. LVM for pt to return call. Awating call back.

## 2017-03-14 NOTE — TELEPHONE ENCOUNTER
----- Message from Adwoa Reaves sent at 3/13/2017  9:02 AM CDT -----  Contact: Patient  QUENTIN- Patient would like to update you on the state of his health after his TACE procedure on Friday. Patient also needs a letter for his airline tickets so he can be reimbursed the ticket flight change fee. Patient can be reached  at [phone].

## 2017-03-14 NOTE — TELEPHONE ENCOUNTER
Returned call to patient and discussed his health as requested.  He is doing okay, has some nausea, pain and constipation but he is dealing with it.  Reviewed the labs he needs post TACE and emailed him the orders as well and AVS, discharge instructions, liver biopsy report and TACE report to [facility] as per in his chart.  Also was upset with the way he got his lanreotide injecton in infusion stating that she just jabbed it in and pushed it very rapidly and not over 20 secs as the package states,  He said it feels fine and no bump but it hurt going in.  Also said he is having trouble with his repaired rotator cuff but stated that was from the positioning on the table for the procedure.

## 2017-03-15 LAB — POCT GLUCOSE: 138 MG/DL (ref 70–110)

## 2017-03-20 RX ORDER — ONDANSETRON 4 MG/1
4 TABLET, ORALLY DISINTEGRATING ORAL EVERY 6 HOURS PRN
Qty: 20 TABLET | Refills: 0 | Status: ON HOLD | OUTPATIENT
Start: 2017-03-20 | End: 2017-07-15 | Stop reason: HOSPADM

## 2017-03-20 NOTE — TELEPHONE ENCOUNTER
----- Message from Katty Mir sent at 3/20/2017  1:09 PM CDT -----  QUENTIN- Patient is need of his orders to be emailed to him at [email or web address]. Also, he is in need of a refill for ondansetron (ZOFRAN-ODT) 4 MG TbDL to be called into:  [facility]/pharmacy #[id number] [place], [place] - [address] [phone] (Phone)  [phone] (Fax). Please call back to assist.

## 2017-03-22 ENCOUNTER — PATIENT MESSAGE (OUTPATIENT)
Dept: NEUROLOGY | Facility: HOSPITAL | Age: 82
End: 2017-03-22

## 2017-03-24 ENCOUNTER — TELEPHONE (OUTPATIENT)
Dept: NEUROLOGY | Facility: HOSPITAL | Age: 82
End: 2017-03-24

## 2017-03-24 NOTE — TELEPHONE ENCOUNTER
----- Message from Adwoa Reaves sent at 3/24/2017 12:30 PM CDT -----  QUENTIN-Patient would like a call back about his test results. Patient can be reached at [phone].

## 2017-03-24 NOTE — TELEPHONE ENCOUNTER
Returned call to patient and discussed the post tace labs and how he was feeling.  He said it is taking hime longer to bounce back this time.  He has not had his labs done so faxed him another lab orders set.  Also sent a message to Dr Hernandez to call him with his pathology results as soon as he could.

## 2017-03-25 LAB
EXT 24 HR UR METANEPHRINE: ABNORMAL
EXT 24 HR UR NORMETANEPHRINE: ABNORMAL
EXT 24 HR UR NORMETANEPHRINE: ABNORMAL
EXT 25 HYDROXY VIT D2: ABNORMAL
EXT 25 HYDROXY VIT D3: ABNORMAL
EXT 5 HIAA 24 HR URINE: ABNORMAL
EXT 5 HIAA BLOOD: ABNORMAL
EXT ACTH: ABNORMAL
EXT AFP: ABNORMAL
EXT ALBUMIN: 3.6 G/DL (ref 3.5–4.8)
EXT ALKALINE PHOSPHATASE: 784 IU/L (ref 39–117)
EXT ALT: 221 (ref 0–44)
EXT AMYLASE: ABNORMAL
EXT ANTI ISLET CELL AB: ABNORMAL
EXT ANTI PARIETAL CELL AB: ABNORMAL
EXT ANTI THYROID AB: ABNORMAL
EXT AST: 145 IU/L (ref 0–40)
EXT BILIRUBIN DIRECT: ABNORMAL
EXT BILIRUBIN TOTAL: 0.8 MG/DL (ref 0–1.2)
EXT BK VIRUS DNA QN PCR: ABNORMAL
EXT BUN: 18 MG/DL (ref 8–27)
EXT C PEPTIDE: ABNORMAL
EXT CA 125: ABNORMAL
EXT CA 19-9: ABNORMAL
EXT CA 27-29: ABNORMAL
EXT CALCITONIN: ABNORMAL
EXT CALCIUM: 9.8 MG/DL (ref 8.6–10.2)
EXT CEA: ABNORMAL
EXT CHLORIDE: 94 MMOL/L (ref 96–106)
EXT CHOLESTEROL: ABNORMAL
EXT CHROMOGRANIN A: ABNORMAL
EXT CO2: 25 MMOL/L (ref 18–29)
EXT CREATININE UA: ABNORMAL
EXT CREATININE: 1.01 MG/DL (ref 0.76–1.27)
EXT CYCLOSPORONE LEVEL: ABNORMAL
EXT DOPAMINE: ABNORMAL
EXT EBV DNA BY PCR: ABNORMAL
EXT EPINEPHRINE: ABNORMAL
EXT FOLATE: ABNORMAL
EXT FREE T3: ABNORMAL
EXT FREE T4: ABNORMAL
EXT FSH: ABNORMAL
EXT GASTRIN RELEASING PEPTIDE: ABNORMAL
EXT GASTRIN RELEASING PEPTIDE: ABNORMAL
EXT GASTRIN: ABNORMAL
EXT GGT: ABNORMAL
EXT GHRELIN: ABNORMAL
EXT GLUCAGON: ABNORMAL
EXT GLUCOSE: 132 MG/DL (ref 65–99)
EXT GROWTH HORMONE: ABNORMAL
EXT HCV RNA QUANT PCR: ABNORMAL
EXT HDL: ABNORMAL
EXT HEMATOCRIT: 40 % (ref 37.5–51)
EXT HEMOGLOBIN A1C: ABNORMAL
EXT HEMOGLOBIN: 12.9 G/DL (ref 12.6–17.7)
EXT HISTAMINE 24 HR URINE: ABNORMAL
EXT HISTAMINE: ABNORMAL
EXT IGF-1: ABNORMAL
EXT IMMUNKNOW (NON-STIMULATED): ABNORMAL
EXT IMMUNKNOW (STIMULATED): ABNORMAL
EXT INR: ABNORMAL
EXT INSULIN: ABNORMAL
EXT LANREOTIDE LEVEL: ABNORMAL
EXT LDH, TOTAL: ABNORMAL
EXT LDL CHOLESTEROL: ABNORMAL
EXT LIPASE: ABNORMAL
EXT MAGNESIUM: ABNORMAL
EXT METANEPHRINE FREE PLASMA: ABNORMAL
EXT MOTILIN: ABNORMAL
EXT NEUROKININ A CAMB: ABNORMAL
EXT NEUROKININ A ISI: ABNORMAL
EXT NEUROTENSIN: ABNORMAL
EXT NOREPINEPHRINE: ABNORMAL
EXT NORMETANEPHRINE: ABNORMAL
EXT NSE: ABNORMAL
EXT OCTREOTIDE LEVEL: ABNORMAL
EXT PANCREASTATIN CAMB: ABNORMAL
EXT PANCREASTATIN ISI: ABNORMAL
EXT PANCREATIC POLYPEPTIDE: ABNORMAL
EXT PHOSPHORUS: ABNORMAL
EXT PLATELETS: 353 X10E3/UL (ref 150–379)
EXT POTASSIUM: 4.7 MMOL/L (ref 3.5–5.2)
EXT PROGRAF LEVEL: ABNORMAL
EXT PROLACTIN: ABNORMAL
EXT PROTEIN TOTAL: 6.8 G/DL (ref 6–8.5)
EXT PROTEIN UA: ABNORMAL
EXT PT: ABNORMAL
EXT PTH, INTACT: ABNORMAL
EXT PTT: ABNORMAL
EXT RAPAMUNE LEVEL: ABNORMAL
EXT SEROTONIN: ABNORMAL
EXT SODIUM: 137 MMOL/L (ref 134–144)
EXT SOMATOSTATIN: ABNORMAL
EXT SUBSTANCE P: ABNORMAL
EXT TRIGLYCERIDES: ABNORMAL
EXT TRYPTASE: ABNORMAL
EXT TSH: ABNORMAL
EXT URIC ACID: ABNORMAL
EXT URINE AMYLASE U/HR: ABNORMAL
EXT URINE AMYLASE U/L: ABNORMAL
EXT VASOACTIVE INTESTINAL POLYPEPTIDE: ABNORMAL
EXT VITAMIN B12: ABNORMAL
EXT VMA 24 HR URINE: ABNORMAL
EXT WBC: 8.1 X10E3/UL (ref 3.4–10.8)
NEURON SPECIFIC ENOLASE: ABNORMAL

## 2017-03-29 ENCOUNTER — TELEPHONE (OUTPATIENT)
Dept: NEUROLOGY | Facility: HOSPITAL | Age: 82
End: 2017-03-29

## 2017-03-29 NOTE — TELEPHONE ENCOUNTER
----- Message from Adwoa Reaves sent at 3/29/2017 10:24 AM CDT -----  Contact: Patient  QUENTIN- Patient is calling back  in regards to the results from the Tumor Board. Patient would like a call back today and can be reached at [phone]

## 2017-03-29 NOTE — TELEPHONE ENCOUNTER
Returned pts call to inform that I have spoken with Dr. Braun and he will be calling him as soon as he is able. No answer. LVM for pt to return call if any questions.

## 2017-03-30 ENCOUNTER — TELEPHONE (OUTPATIENT)
Dept: NEUROLOGY | Facility: HOSPITAL | Age: 82
End: 2017-03-30

## 2017-03-30 NOTE — TELEPHONE ENCOUNTER
Returned pts call to inquire which trial he is speaking of. He states that Dr. Hernandez wanted to look into the Immunotherapy trial with Dr. Rodriguez. He is also wanting any written materials available on said trial. Appt made w/ Dr. Rodriguez, will seek direction on written materials.

## 2017-03-30 NOTE — TELEPHONE ENCOUNTER
----- Message from Karely Waggoner sent at 3/29/2017  4:00 PM CDT -----  QUENTIN- Patient would like information on the therapy trial emailed to him. Thanks

## 2017-04-04 ENCOUNTER — CONFERENCE (OUTPATIENT)
Dept: NEUROLOGY | Facility: HOSPITAL | Age: 82
End: 2017-04-04
Payer: MEDICARE

## 2017-04-04 NOTE — TELEPHONE ENCOUNTER
Multidisciplinary NET Tumor Board Summary:    Attendees:  Michael Waterman Boudreaux, Ramcharan, Gimenez     Discussion: history reviewed, O scan was negative, very active socially, have a trial if he is interested, if interested in chemo we can do that    Recommendations:   1. Needs a very early appt with Johan

## 2017-04-05 ENCOUNTER — TELEPHONE (OUTPATIENT)
Dept: RESEARCH | Facility: HOSPITAL | Age: 82
End: 2017-04-05

## 2017-04-05 NOTE — TELEPHONE ENCOUNTER
Spoke with patient today regarding the MATCH and then possibly DART trial. Pt requesting for his tumor tissue from his biopsy in March to be sent for the MATCH trial. Informed patient that in order for me to be able to send his tissue, he needs to sign an informed consent for the trial and be eligible based on all inclusion and exclusion criteria. Pt wishing for consent to be mailed to him for him to sign and then send to me. Educated patient on the informed consent process and that he needs to be physically present to sign the consent with the research coordinator. Pt voiced understanding. Informed patient that for this trial, we can possibly send archival tissue from his liver biopsy as long as his next CT scan shows lack of response to his TACE procedure as stated per the study protocol. Also discussed the DART trial with the pt and explained that in order to possibly qualify for this trial, he has to fail MATCH. Educated patient that the trial consist of 2 drugs with one drug given on day 1 while the other drug is given on days 1, 8, and 15. Pt states that he will be staying in [place] for the summer, and he knows that these trials are available there. Discussed the possibility of doing MATH trial through [facility] if CT scan shows progression and then doing DART trial in [place]. Pt scheduled to see Dr. Rodriguez on 5/2/17 to discuss his treatment options and any possible trials. Pt will also let us know when he has his scans and when they are resulted.

## 2017-04-07 ENCOUNTER — TELEPHONE (OUTPATIENT)
Dept: NEUROLOGY | Facility: HOSPITAL | Age: 82
End: 2017-04-07

## 2017-04-07 NOTE — TELEPHONE ENCOUNTER
----- Message from Adwoa Reaves sent at 4/6/2017  2:10 PM CDT -----  Contact: patient  RR/NEW- Patient would like to know if he needs to have any tests before his appointment with Dr. Rodriguez on 5/2/2017. Patient can be reached at [phone].

## 2017-04-07 NOTE — TELEPHONE ENCOUNTER
----- Message from Adwoa Reaves sent at 4/7/2017  2:45 PM CDT -----  Contact: Patient  RR- Patient would like to talk to Nannette in regards to the blood work he faxed today. Also he has questions about his travel arrangements. Patient can be reached at  [phone].

## 2017-04-07 NOTE — TELEPHONE ENCOUNTER
Returned pts call. Informed that I did receive his current labs and his Alkaline Phosphatase has dropped significantly from prior. Pt verbalizes understanding and is pleased.

## 2017-04-07 NOTE — TELEPHONE ENCOUNTER
Returned pts call. Informed that as we know, there are no additional tests needed prior to appointment with Dr. Rodriguez, except for the MRI he already has scheduled. Pt also concerned about liver enzymes being elevated. He repeated labs yesterday. Advised pt that we will look out for labs to ensure that his liver enzymes and alkaline phosphatase has come back down after TACE. Pt will forward our office any results that he receives. Pt verbalizes understanding.

## 2017-04-17 ENCOUNTER — TELEPHONE (OUTPATIENT)
Dept: NEUROLOGY | Facility: HOSPITAL | Age: 82
End: 2017-04-17

## 2017-04-17 NOTE — TELEPHONE ENCOUNTER
Returned pts call. Pt states that he is wondering if we got MRI. Informed pt that we have not yet received MRI but we will keep an eye out for it. Pt verbalizes understanding.

## 2017-04-17 NOTE — TELEPHONE ENCOUNTER
----- Message from Karely Waggoner sent at 4/17/2017 11:59 AM CDT -----  QUENTIN- Patient called regarding his MRI. Please call patient back at [phone].

## 2017-05-02 ENCOUNTER — OFFICE VISIT (OUTPATIENT)
Dept: NEUROLOGY | Facility: HOSPITAL | Age: 82
End: 2017-05-02
Attending: INTERNAL MEDICINE
Payer: MEDICARE

## 2017-05-02 VITALS
DIASTOLIC BLOOD PRESSURE: 69 MMHG | BODY MASS INDEX: 26.11 KG/M2 | WEIGHT: 182.38 LBS | HEIGHT: 70 IN | HEART RATE: 57 BPM | SYSTOLIC BLOOD PRESSURE: 132 MMHG | TEMPERATURE: 98 F

## 2017-05-02 DIAGNOSIS — C7B.02 SECONDARY MALIGNANT CARCINOID TUMOR OF LIVER: ICD-10-CM

## 2017-05-02 DIAGNOSIS — C7A.012 MALIGNANT CARCINOID TUMOR OF ILEUM: Primary | ICD-10-CM

## 2017-05-02 LAB
EXT 24 HR UR METANEPHRINE: ABNORMAL
EXT 24 HR UR NORMETANEPHRINE: ABNORMAL
EXT 24 HR UR NORMETANEPHRINE: ABNORMAL
EXT 25 HYDROXY VIT D2: ABNORMAL
EXT 25 HYDROXY VIT D3: ABNORMAL
EXT 5 HIAA 24 HR URINE: ABNORMAL
EXT 5 HIAA BLOOD: 646 NG/ML (ref 0–22)
EXT ACTH: ABNORMAL
EXT AFP: ABNORMAL
EXT ALBUMIN: ABNORMAL
EXT ALKALINE PHOSPHATASE: ABNORMAL
EXT ALT: ABNORMAL
EXT AMYLASE: ABNORMAL
EXT ANTI ISLET CELL AB: ABNORMAL
EXT ANTI PARIETAL CELL AB: ABNORMAL
EXT ANTI THYROID AB: ABNORMAL
EXT AST: ABNORMAL
EXT BILIRUBIN DIRECT: ABNORMAL MG/DL
EXT BILIRUBIN TOTAL: ABNORMAL
EXT BK VIRUS DNA QN PCR: ABNORMAL
EXT BUN: ABNORMAL
EXT C PEPTIDE: ABNORMAL
EXT CA 125: ABNORMAL
EXT CA 19-9: ABNORMAL
EXT CA 27-29: ABNORMAL
EXT CALCITONIN: ABNORMAL
EXT CALCIUM: ABNORMAL
EXT CEA: ABNORMAL
EXT CHLORIDE: ABNORMAL
EXT CHOLESTEROL: ABNORMAL
EXT CHROMOGRANIN A: ABNORMAL
EXT CO2: ABNORMAL
EXT CREATININE UA: ABNORMAL
EXT CREATININE: ABNORMAL MG/DL
EXT CYCLOSPORONE LEVEL: ABNORMAL
EXT DOPAMINE: ABNORMAL
EXT EBV DNA BY PCR: ABNORMAL
EXT EPINEPHRINE: ABNORMAL
EXT FOLATE: ABNORMAL
EXT FREE T3: ABNORMAL
EXT FREE T4: ABNORMAL
EXT FSH: ABNORMAL
EXT GASTRIN RELEASING PEPTIDE: ABNORMAL
EXT GASTRIN RELEASING PEPTIDE: ABNORMAL
EXT GASTRIN: ABNORMAL
EXT GGT: ABNORMAL
EXT GHRELIN: ABNORMAL
EXT GLUCAGON: ABNORMAL
EXT GLUCOSE: ABNORMAL
EXT GROWTH HORMONE: ABNORMAL
EXT HCV RNA QUANT PCR: ABNORMAL
EXT HDL: ABNORMAL
EXT HEMATOCRIT: ABNORMAL
EXT HEMOGLOBIN A1C: ABNORMAL
EXT HEMOGLOBIN: ABNORMAL
EXT HISTAMINE 24 HR URINE: ABNORMAL
EXT HISTAMINE: ABNORMAL
EXT IGF-1: ABNORMAL
EXT IMMUNKNOW (NON-STIMULATED): ABNORMAL
EXT IMMUNKNOW (STIMULATED): ABNORMAL
EXT INR: ABNORMAL
EXT INSULIN: ABNORMAL
EXT LANREOTIDE LEVEL: ABNORMAL
EXT LDH, TOTAL: ABNORMAL
EXT LDL CHOLESTEROL: ABNORMAL
EXT LIPASE: ABNORMAL
EXT MAGNESIUM: ABNORMAL
EXT METANEPHRINE FREE PLASMA: ABNORMAL
EXT MOTILIN: ABNORMAL
EXT NEUROKININ A CAMB: ABNORMAL
EXT NEUROKININ A ISI: 11 PG/ML (ref 0–40)
EXT NEUROTENSIN: ABNORMAL
EXT NOREPINEPHRINE: ABNORMAL
EXT NORMETANEPHRINE: ABNORMAL
EXT NSE: ABNORMAL
EXT OCTREOTIDE LEVEL: ABNORMAL
EXT PANCREASTATIN CAMB: ABNORMAL
EXT PANCREASTATIN ISI: 836 PG/ML (ref 10–135)
EXT PANCREATIC POLYPEPTIDE: ABNORMAL
EXT PHOSPHORUS: ABNORMAL
EXT PLATELETS: ABNORMAL
EXT POTASSIUM: ABNORMAL
EXT PROGRAF LEVEL: ABNORMAL
EXT PROLACTIN: ABNORMAL
EXT PROTEIN TOTAL: ABNORMAL
EXT PROTEIN UA: ABNORMAL
EXT PT: ABNORMAL
EXT PTH, INTACT: ABNORMAL
EXT PTT: ABNORMAL
EXT RAPAMUNE LEVEL: ABNORMAL
EXT SEROTONIN: 931 NG/ML (ref 56–244)
EXT SODIUM: ABNORMAL MMOL/L
EXT SOMATOSTATIN: ABNORMAL
EXT SUBSTANCE P: ABNORMAL
EXT TRIGLYCERIDES: ABNORMAL
EXT TRYPTASE: ABNORMAL
EXT TSH: ABNORMAL
EXT URIC ACID: ABNORMAL
EXT URINE AMYLASE U/HR: ABNORMAL
EXT URINE AMYLASE U/L: ABNORMAL
EXT VASOACTIVE INTESTINAL POLYPEPTIDE: ABNORMAL
EXT VITAMIN B12: ABNORMAL
EXT VMA 24 HR URINE: ABNORMAL
EXT WBC: ABNORMAL
NEURON SPECIFIC ENOLASE: ABNORMAL

## 2017-05-02 PROCEDURE — 99214 OFFICE O/P EST MOD 30 MIN: CPT | Performed by: INTERNAL MEDICINE

## 2017-05-02 PROCEDURE — 99205 OFFICE O/P NEW HI 60 MIN: CPT | Mod: ,,, | Performed by: INTERNAL MEDICINE

## 2017-05-02 NOTE — PATIENT INSTRUCTIONS
Markers today at [facility].  Tumor board to review.  Will call with tumor board recommendations.

## 2017-05-02 NOTE — LETTER
May 2, 2017        Rian Melendez DO  [address]  [place] [address] [facility]  [place] [address]  Phone: [phone]  Fax: [phone]   Patient: Delmar Mckee   MR Number: [id number]   YOB: 1935   Date of Visit: 5/2/2017       Dear Dr. Melendez:    Thank you for referring Delmar Mckee to me for evaluation. Attached you will find relevant portions of my assessment and plan of care.    If you have questions, please do not hesitate to call me. I look forward to following Delmar Mckee along with you.    Sincerely,      Christo Rodriguez DO, FACP            CC  Aparna Quintanilla MD    Enclosure

## 2017-05-02 NOTE — MR AVS SNAPSHOT
[facility] [address] [place] [address]  Phone: [phone]  Fax: [phone]                  Delmar Mckee   2017 2:00 PM   Office Visit    Description:  Male : 1935   Provider:  Christo Rodriguez DO, FACP   Department:  [facility]           Reason for Visit     Consult           Diagnoses this Visit        Comments    Malignant carcinoid tumor of ileum    -  Primary     Secondary malignant carcinoid tumor of liver                To Do List           Goals (5 Years of Data)     None      [facility]     [facility] Nurse Care Line -  Assistance  Unless otherwise directed by your provider, please contact [facility], our nurse care line that is available for  assistance.     Registered nurses in the [facility] Center provide: appointment scheduling, clinical advisement, health education, and other advisory services.  Call: [phone] (toll free)               Medications           Message regarding Medications     Verify the changes and/or additions to your medication regime listed below are the same as discussed with your clinician today.  If any of these changes or additions are incorrect, please notify your healthcare provider.             Verify that the below list of medications is an accurate representation of the medications you are currently taking.  If none reported, the list may be blank. If incorrect, please contact your healthcare provider. Carry this list with you in case of emergency.           Current Medications     aspirin (ECOTRIN) 81 MG EC tablet Take by mouth.    atorvastatin (LIPITOR) 10 MG tablet 5 mg every evening.     cholecalciferol, vitamin D3, 2,000 unit Tab Take 1 tablet by mouth once daily.     cholestyramine (QUESTRAN) 4 gram packet TAKE 1 PACKET TWICE DAILY IN WATER    cinnamon bark 500 mg capsule Take by mouth.    co-enzyme Q-10 30 mg capsule Take 30 mg by mouth once daily.     CREON  "24,000-76,000 -120,000 unit capsule Take 2 capsules by mouth 4 (four) times daily with meals and nightly.    lactulose (CHRONULAC) 10 gram/15 mL solution Take 10 g by mouth 3 (three) times daily.    lanreotide (SOMATULINE DEPOT) 120 mg/0.5 mL Syrg Inject 120 mg into the skin every 28 days.    levothyroxine (SYNTHROID, LEVOTHROID) 175 MCG tablet Take 175 mcg by mouth once daily.    magnesium hydroxide (MARC CHEWS) 311 MG Chew Take 1 tablet (311 mg total) by mouth 2 (two) times daily as needed.    metformin (GLUCOPHAGE) 850 MG tablet Take 850 mg by mouth 2 (two) times daily with meals.     multivitamin (THERAGRAN) per tablet Take 1 tablet by mouth once daily.     nateglinide (STARLIX) 60 MG tablet Take 60 mg by mouth 3 (three) times daily before meals.    ondansetron (ZOFRAN-ODT) 4 MG TbDL Take 1 tablet (4 mg total) by mouth every 6 (six) hours as needed.    ramipril (ALTACE) 10 MG capsule 2 (two) times daily.     saw palmetto fruit 450 mg Cap Take 1 capsule by mouth 2 (two) times daily.     temazepam (RESTORIL) 15 mg Cap 15 mg every evening.     terazosin (HYTRIN) 1 MG capsule 1 mg every evening.     tramadol (ULTRAM) 50 mg tablet Take 1 tablet (50 mg total) by mouth every 6 (six) hours as needed for Pain.    vitamin A 8000 UNIT capsule Take 8,000 Units by mouth.           Clinical Reference Information           Your Vitals Were     BP Pulse Temp Height Weight BMI    132/69 (BP Location: Right arm, Patient Position: Sitting) 57 98 °F (36.7 °C) (Oral) 5' 10" (1.778 m) 82.7 kg (182 lb 6.4 oz) 26.17 kg/m2      Blood Pressure          Most Recent Value    BP  132/69      Allergies as of 5/2/2017     Epinephrine    Latex, Natural Rubber      Immunizations Administered on Date of Encounter - 5/2/2017     None      Instructions    Markers today at [facility].  Tumor board to review.  Will call with tumor board recommendations.       Language Assistance Services     ATTENTION: Language assistance services are available, free " of charge. Please call [phone].      ATENCIÓN: Si habla español, tiene a mosquera disposición servicios gratuitos de asistencia lingüística. Llame al [phone].     CHÚ Ý: N?u b?n nói Ti?ng Vi?t, có các d?ch v? h? tr? ngôn ng? mi?n phí dành cho b?n. G?i s? [phone].         [facility] complies with applicable Federal civil rights laws and does not discriminate on the basis of race, color, national origin, age, disability, or sex.

## 2017-05-02 NOTE — PROGRESS NOTES
NOLANETS:  [facility]  A collaboration between [facility] and [facility]    PATIENT: Delmar Mckee  MRN: [id number]  DATE: 5/2/2017      Diagnosis:   1. Malignant carcinoid tumor of ileum    2. 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 Mckee is a 81 y.o. male who is seen as an initial visit for a small intestine neuroendocrine tumor.  His history dates to 2009 when he sought medical attention for abdominal pain and was found to have an abdominal mass with multiple lesions in the liver.  He underwent a small bowel resection and cytoreduction at that time.  Since this time he has been on multiple therapies including Sandostatin, pazopanib on a clinical trial, and then later underwent radiofrequency ablation to the liver and also bland embolization and in TACE ×2.  A biopsy performed in 3/2017 had revealed a high-grade neuroendocrine carcinoma with Ki-67 of 25.8%.      Following his last TACE he states he did have a significant recovery time with abdominal pain but this has since resolved.  He states he is fully active.  His flushing has decreased.  He is able to play golf.  He denies any diarrhea.  He is without other complaints.    Past Medical History:   Past Medical History:   Diagnosis Date    Diabetes mellitus type II,  controlled     Drug therapy     lanreotide 6/2016    HTN (hypertension) 2016    Hypothyroidism     Malignant carcinoid tumor of ileum 2009       Past Surgical HIstory:   Past Surgical History:   Procedure Laterality Date    ABDOMINAL SURGERY  2009    bland embo  2012, 2013    drug therapy      sandostatin 30 mg every 28 days    drug trial  2015 summer    pazopanib    nanoknofe  2011    RFA  2011    Pt denies having in 2012, 2013 (7/26/16)       Family History: History reviewed. No pertinent family history.    Social History:  reports that he has quit smoking. He has a 10.00 pack-year smoking history. He does not have any smokeless tobacco history on file. He reports that he does not drink alcohol.    Allergies:  Review of patient's allergies indicates:   Allergen Reactions    Epinephrine Anaphylaxis     Can cause Carcinoid Crisis    Latex, natural rubber Rash       Medications:  Current Outpatient Prescriptions   Medication Sig Dispense Refill    aspirin (ECOTRIN) 81 MG EC tablet Take by mouth.      cholecalciferol, vitamin D3, 2,000 unit Tab Take 1 tablet by mouth once daily.       cholestyramine (QUESTRAN) 4 gram packet TAKE 1 PACKET TWICE DAILY IN WATER  0    cinnamon bark 500 mg capsule Take by mouth.      co-enzyme Q-10 30 mg capsule Take 30 mg by mouth once daily.       CREON 24,000-76,000 -120,000 unit capsule Take 2 capsules by mouth 4 (four) times daily with meals and nightly. (Patient taking differently: Take 2 capsules by mouth 3 (three) times daily with meals. ) 900 capsule 3    lactulose (CHRONULAC) 10 gram/15 mL solution Take 10 g by mouth 3 (three) times daily.      lanreotide (SOMATULINE DEPOT) 120 mg/0.5 mL Syrg Inject 120 mg into the skin every 28 days.      levothyroxine (SYNTHROID, LEVOTHROID) 175 MCG tablet Take 175 mcg by mouth once daily.  8    magnesium hydroxide (MARC CHEWS) 311 MG Chew Take 1 tablet (311 mg total) by mouth 2 (two) times daily as needed. 30 tablet 1     metformin (GLUCOPHAGE) 850 MG tablet Take 850 mg by mouth 2 (two) times daily with meals.   3    multivitamin (THERAGRAN) per tablet Take 1 tablet by mouth once daily.       nateglinide (STARLIX) 60 MG tablet Take 60 mg by mouth 3 (three) times daily before meals.      ondansetron (ZOFRAN-ODT) 4 MG TbDL Take 1 tablet (4 mg total) by mouth every 6 (six) hours as needed. 20 tablet 0    ramipril (ALTACE) 10 MG capsule 2 (two) times daily.       saw palmetto fruit 450 mg Cap Take 1 capsule by mouth 2 (two) times daily.       temazepam (RESTORIL) 15 mg Cap 15 mg every evening.       terazosin (HYTRIN) 1 MG capsule 1 mg every evening.       tramadol (ULTRAM) 50 mg tablet Take 1 tablet (50 mg total) by mouth every 6 (six) hours as needed for Pain. 30 tablet 0    vitamin A 8000 UNIT capsule Take 8,000 Units by mouth.      atorvastatin (LIPITOR) 10 MG tablet 5 mg every evening.        No current facility-administered medications for this visit.        Review of Systems   Constitutional: Negative for activity change, appetite change, chills, fatigue, fever and unexpected weight change.   HENT: Negative for dental problem, sinus pressure and sneezing.    Eyes: Negative for visual disturbance.   Respiratory: Negative for cough, choking and chest tightness.    Cardiovascular: Negative for chest pain and leg swelling.   Gastrointestinal: Negative for abdominal pain, blood in stool, constipation, diarrhea and nausea.   Genitourinary: Negative for difficulty urinating and dysuria.   Musculoskeletal: Negative for arthralgias and back pain.   Skin: Negative for rash and wound.        Flushing   Neurological: Negative for dizziness, light-headedness and headaches.   Hematological: Negative for adenopathy. Does not bruise/bleed easily.   Psychiatric/Behavioral: Negative for sleep disturbance. The patient is not nervous/anxious.        ECOG Performance Status: 0   Objective:      Vitals:   Vitals:    05/02/17 1338   BP:  "132/69   BP Location: Right arm   Patient Position: Sitting   Pulse: (!) 57   Temp: 98 °F (36.7 °C)   TempSrc: Oral   Weight: 82.7 kg (182 lb 6.4 oz)   Height: 5' 10" (1.778 m)     BMI: Body mass index is 26.17 kg/(m^2).    Physical Exam   Constitutional: He is oriented to person, place, and time. He appears well-developed and well-nourished.   HENT:   Head: Normocephalic and atraumatic.   Eyes: Pupils are equal, round, and reactive to light.   Neck: Normal range of motion. Neck supple.   Cardiovascular: Normal rate and regular rhythm.    Pulmonary/Chest: Effort normal and breath sounds normal. No respiratory distress.   Abdominal: Soft. He exhibits no distension. There is no tenderness.   Musculoskeletal: He exhibits no edema or tenderness.   Lymphadenopathy:     He has no cervical adenopathy.   Neurological: He is alert and oriented to person, place, and time. No cranial nerve deficit.   Skin: Skin is warm and dry.   Psychiatric: He has a normal mood and affect. His behavior is normal.       Laboratory Data:  No visits with results within 1 Month(s) from this visit.  Latest known visit with results is:    Abstract on 03/28/2017   Component Date Value Ref Range Status    EXT WBC 03/25/2017 8.1  3.4 - 10.8 x10e3/ul Final    EXT Hemoglobin 03/25/2017 12.9  12.6 - 17.7 g/dl Final    EXT Hematocrit 03/25/2017 40.0  37.5 - 51.0 % Final    EXT Platelets 03/25/2017 353  150 - 379 x10e3/ul Final    EXT Glucose 03/25/2017 132* 65 - 99 mg/dl Final    EXT BUN 03/25/2017 18  8 - 27 mg/dl Final    EXT Creatinine 03/25/2017 1.01  0.76 - 1.27 mg/dl Final    EXT Sodium 03/25/2017 137  134 - 144 mmol/L Final    EXT Potassium 03/25/2017 4.7  3.5 - 5.2 mmol/l Final    EXT Chloride 03/25/2017 94* 96 - 106 mmol/l Final    EXT CO2 03/25/2017 25  18 - 29 mmol/l Final    EXT Calcium 03/25/2017 9.8  8.6 - 10.2 mg/dl Final    EXT Protein total 03/25/2017 6.8  6.0 - 8.5 g/dl Final    EXT Albumin 03/25/2017 3.6  3.5 - 4.8 g/dl " Final    EXT BilirubiN Total 03/25/2017 0.8  0.0 - 1.2 mg/dl Final    EXT Alkaline Phosphatase 03/25/2017 784* 39 - 117 iu/l Final    EXT AST 03/25/2017 145* 0 - 40 iu/l Final    EXT ALT 03/25/2017 221* 0 - 44 iu//l Final       FINAL PATHOLOGIC DIAGNOSIS  LIVER, FINE NEEDLE BIOPSY:  -Neuroendocrine carcinoma, 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  (Electronically Signed: 2017-03-23 09:03:46)  Microscopic Examination  NET PANEL:  -Ki-67: Positive; up to 25.8 % cells staining (190 of 736 tumor cells)  -Chromogranin: Positive; 2+: 95 % cells; 3+: 5% cells  -Synaptophysin: Positive; 3+: 100 % cells  -CD31: Positive; 13 vessels per HPF  -Factor VIII: Positive; 8 vessels per HPF  -TTF: not applicable  Immunostains performed with appropriate positive and negative controls.  A tumor with these histologic findings and Ki-67 proliferation index of 25.8% represents a high grade neoplasm for  neuroendocrine tumors of most organ primaries.    Imaging:   Outside images reviewed.         Assessment:       1. Malignant carcinoid tumor of ileum    2. Secondary malignant carcinoid tumor of liver           Plan:     I have had a long discussion with Mr. Mckee today and reviewed his scans with him.  Review of his latest MRI does show response of disease in the area that was previously embolized.  The rest of his liver lesions and lymph nodes appear stable.  His Ki-67 from his liver biopsy did indicate a high-grade tumor, however, this does appear to be responding to treatment.  I will discuss his case and her multidisciplinary neuroendocrine tumor board and discussed the role of radio embolization to this single lesion that appears to be more aggressive than the other lesions.  He is not a candidate for PRRT as he has been negative on octreotide scans in the past.  We have also  discussed the role of clinical trials including MATCH and DART.  He may be a candidate for this, however, I think we can hold off on initiating treatment at this point.  I told him we would contact him following tumor board discussion for further recommendations.  All questions were answered and he is agreeable with this plan.    Christo Rodriguez DO, [facility]  Hematology & Oncology, [facility]  [address], [address]  [place]  [address]  ph. [phone]; [phone]  fax. [phone]    60 minutes were spent in coordination of patient's care, record review and counseling.  More than 50% of the time was face-to-face.

## 2017-05-09 ENCOUNTER — CONFERENCE (OUTPATIENT)
Dept: NEUROLOGY | Facility: HOSPITAL | Age: 82
End: 2017-05-09
Payer: MEDICARE

## 2017-05-09 NOTE — TELEPHONE ENCOUNTER
Multidisciplinary NET Tumor Board Summary:    Attendees:  Michael Waterman Boudreaux, Ramcharan, Gimenez     Discussion: 2009, disease in liver, clinical trial,     Recommendations:   1. Get an MRI  2. Reassess for disease burden  3. Have Dr Tinsley evaluate for a 4th super selective TACE to largest liver lesions

## 2017-05-11 ENCOUNTER — TELEPHONE (OUTPATIENT)
Dept: NEUROLOGY | Facility: HOSPITAL | Age: 82
End: 2017-05-11

## 2017-05-11 NOTE — TELEPHONE ENCOUNTER
Returned call to number in the message and LVM regarding the TB recs and also to give us a call back.

## 2017-05-11 NOTE — TELEPHONE ENCOUNTER
----- Message from Jeanie Washburn sent at 5/10/2017  8:39 AM CDT -----  Contact: Patient  RR- patient called for Nannette concerning your labs and his visit with Dr. Rodriguez and he would like to know the results of the tumor board. Patients contact number [phone].

## 2017-05-12 ENCOUNTER — TELEPHONE (OUTPATIENT)
Dept: HEMATOLOGY/ONCOLOGY | Facility: CLINIC | Age: 82
End: 2017-05-12

## 2017-05-12 ENCOUNTER — PATIENT MESSAGE (OUTPATIENT)
Dept: NEUROLOGY | Facility: HOSPITAL | Age: 82
End: 2017-05-12

## 2017-05-12 ENCOUNTER — TELEPHONE (OUTPATIENT)
Dept: NEUROLOGY | Facility: HOSPITAL | Age: 82
End: 2017-05-12

## 2017-05-12 NOTE — TELEPHONE ENCOUNTER
----- Message from Adwoa Reaves sent at 5/12/2017  8:32 AM CDT -----  Contact: Patient  RR- Patient called to inform us he brought the CD of his MRI in at his last appointment. Also the patient would like a call back in regards to questions about a TACE. Patient can be reached at [phone].

## 2017-05-15 ENCOUNTER — TELEPHONE (OUTPATIENT)
Dept: NEUROLOGY | Facility: HOSPITAL | Age: 82
End: 2017-05-15

## 2017-05-15 NOTE — TELEPHONE ENCOUNTER
----- Message from Adwoa Reaves sent at 5/12/2017  4:12 PM CDT -----  Contact: patient  RR- Patient is returning Pams call. Patient can be reached at [phone].

## 2017-05-15 NOTE — TELEPHONE ENCOUNTER
----- Message from Jeanie Washburn sent at 5/15/2017  8:39 AM CDT -----  Contact: Patient   RR- Patient called returning Denise and Dr. Rodriguez's call. Patient can be reached at [phone]

## 2017-05-15 NOTE — TELEPHONE ENCOUNTER
----- Message from Katty Mir sent at 5/15/2017 12:49 PM CDT -----  RR- Patient is calling back to speak with Denise and Dr. Rodriguez. Please call back to assist.

## 2017-05-15 NOTE — TELEPHONE ENCOUNTER
Spoke with pt.  He did have the MRI post last TACE.  We have report and will look for CD.  Pt states that TACE really knocks him down.  He would like to discuss other options with Dr. Rodriguez prior to scheduling TACe.  Msg sent to Dr. Rodriguez to contact pt.

## 2017-05-16 ENCOUNTER — TELEPHONE (OUTPATIENT)
Dept: HEMATOLOGY/ONCOLOGY | Facility: CLINIC | Age: 82
End: 2017-05-16

## 2017-05-16 NOTE — PROGRESS NOTES
Spoke to patient concerning tumor Board recommendations.  He is concerned about further TACE as he has had substantial side effects which were prolonged following prior TACE.  He did have an MRI done in 4/2017, however, this is not loaded in our system and will try to track down the results.  He may be amenable to a super selective TACE if it is felt that this will minimize his risk of side effects.  I told him after we review his most recent MRI we will discuss his case again in tumor board and contact him with recommendations.

## 2017-05-19 ENCOUNTER — TELEPHONE (OUTPATIENT)
Dept: NEUROLOGY | Facility: HOSPITAL | Age: 82
End: 2017-05-19

## 2017-05-19 NOTE — TELEPHONE ENCOUNTER
Returned call to mobile number and LVM that we have already requested another CD of his MRI in Diacom format so that it can be downloaded into our computers here and also to call us back if you had any further questions.

## 2017-05-19 NOTE — TELEPHONE ENCOUNTER
----- Message from Adwoa Reaves sent at 5/19/2017  1:08 PM CDT -----  Contact: Patient  RR- Patient wanted to inform Denise that  the MRI CD was mailed to his home in [place] and the Envelope stated No Diacom.

## 2017-05-26 LAB
EXT 24 HR UR METANEPHRINE: ABNORMAL
EXT 24 HR UR NORMETANEPHRINE: ABNORMAL
EXT 24 HR UR NORMETANEPHRINE: ABNORMAL
EXT 25 HYDROXY VIT D2: ABNORMAL
EXT 25 HYDROXY VIT D3: ABNORMAL
EXT 5 HIAA 24 HR URINE: ABNORMAL
EXT 5 HIAA BLOOD: ABNORMAL
EXT ACTH: ABNORMAL
EXT AFP: ABNORMAL
EXT ALBUMIN: 3.9 G/DL (ref 3.2–5.5)
EXT ALKALINE PHOSPHATASE: 602 U/L (ref 42–121)
EXT ALT: 66 U/L (ref 10–60)
EXT AMYLASE: ABNORMAL
EXT ANTI ISLET CELL AB: ABNORMAL
EXT ANTI PARIETAL CELL AB: ABNORMAL
EXT ANTI THYROID AB: ABNORMAL
EXT AST: 82 U/L (ref 10–42)
EXT BILIRUBIN DIRECT: ABNORMAL
EXT BILIRUBIN TOTAL: 1.1 MG/DL (ref 0.2–1)
EXT BK VIRUS DNA QN PCR: ABNORMAL
EXT BUN: 18 MG/DL (ref 6–20)
EXT C PEPTIDE: ABNORMAL
EXT CA 125: ABNORMAL
EXT CA 19-9: ABNORMAL
EXT CA 27-29: ABNORMAL
EXT CALCITONIN: ABNORMAL
EXT CALCIUM: 9.5 MG/DL (ref 8.5–10.5)
EXT CEA: ABNORMAL
EXT CHLORIDE: 106 MMOL/L (ref 101–111)
EXT CHOLESTEROL: ABNORMAL
EXT CHROMOGRANIN A: 654 NG/ML (ref 0–93)
EXT CO2: 24.9 MMOL/L (ref 21–31)
EXT CREATININE UA: ABNORMAL
EXT CREATININE: 1.05 MG/DL (ref 0.5–1.2)
EXT CYCLOSPORONE LEVEL: ABNORMAL
EXT DOPAMINE: ABNORMAL
EXT EBV DNA BY PCR: ABNORMAL
EXT EPINEPHRINE: ABNORMAL
EXT FOLATE: ABNORMAL
EXT FREE T3: ABNORMAL
EXT FREE T4: ABNORMAL
EXT FSH: ABNORMAL
EXT GASTRIN RELEASING PEPTIDE: ABNORMAL
EXT GASTRIN RELEASING PEPTIDE: ABNORMAL
EXT GASTRIN: ABNORMAL
EXT GGT: ABNORMAL
EXT GHRELIN: ABNORMAL
EXT GLUCAGON: ABNORMAL
EXT GLUCOSE: 152 MG/DL (ref 70–115)
EXT GROWTH HORMONE: ABNORMAL
EXT HCV RNA QUANT PCR: ABNORMAL
EXT HDL: ABNORMAL
EXT HEMATOCRIT: 38.7 % (ref 41–53)
EXT HEMOGLOBIN A1C: ABNORMAL
EXT HEMOGLOBIN: 12.4 G/DL (ref 13.5–17.6)
EXT HISTAMINE 24 HR URINE: ABNORMAL
EXT HISTAMINE: ABNORMAL
EXT IGF-1: ABNORMAL
EXT IMMUNKNOW (NON-STIMULATED): ABNORMAL
EXT IMMUNKNOW (STIMULATED): ABNORMAL
EXT INR: ABNORMAL
EXT INSULIN: ABNORMAL
EXT LANREOTIDE LEVEL: ABNORMAL
EXT LDH, TOTAL: ABNORMAL
EXT LDL CHOLESTEROL: ABNORMAL
EXT LIPASE: ABNORMAL
EXT MAGNESIUM: ABNORMAL
EXT METANEPHRINE FREE PLASMA: ABNORMAL
EXT MOTILIN: ABNORMAL
EXT NEUROKININ A CAMB: ABNORMAL
EXT NEUROKININ A ISI: ABNORMAL
EXT NEUROTENSIN: ABNORMAL
EXT NOREPINEPHRINE: ABNORMAL
EXT NORMETANEPHRINE: ABNORMAL
EXT NSE: ABNORMAL
EXT OCTREOTIDE LEVEL: ABNORMAL
EXT PANCREASTATIN CAMB: ABNORMAL
EXT PANCREASTATIN ISI: ABNORMAL
EXT PANCREATIC POLYPEPTIDE: ABNORMAL
EXT PHOSPHORUS: ABNORMAL
EXT PLATELETS: 134 (ref 150–440)
EXT POTASSIUM: 3.7 MMOL/L (ref 3.6–5)
EXT PROGRAF LEVEL: ABNORMAL
EXT PROLACTIN: ABNORMAL
EXT PROTEIN TOTAL: 7.4 G/DL (ref 6.7–8.2)
EXT PROTEIN UA: ABNORMAL
EXT PT: ABNORMAL
EXT PTH, INTACT: ABNORMAL
EXT PTT: ABNORMAL
EXT RAPAMUNE LEVEL: ABNORMAL
EXT SEROTONIN: ABNORMAL
EXT SODIUM: 141 MMOL/L (ref 135–145)
EXT SOMATOSTATIN: ABNORMAL
EXT SUBSTANCE P: ABNORMAL
EXT TRIGLYCERIDES: ABNORMAL
EXT TRYPTASE: ABNORMAL
EXT TSH: ABNORMAL
EXT URIC ACID: ABNORMAL
EXT URINE AMYLASE U/HR: ABNORMAL
EXT URINE AMYLASE U/L: ABNORMAL
EXT VASOACTIVE INTESTINAL POLYPEPTIDE: ABNORMAL
EXT VITAMIN B12: ABNORMAL
EXT VMA 24 HR URINE: ABNORMAL
EXT WBC: 5.2 (ref 4.6–11)
NEURON SPECIFIC ENOLASE: ABNORMAL

## 2017-05-29 ENCOUNTER — TELEPHONE (OUTPATIENT)
Dept: NEUROLOGY | Facility: HOSPITAL | Age: 82
End: 2017-05-29

## 2017-05-29 NOTE — TELEPHONE ENCOUNTER
Spoke with pt.  He wanted to confirm we received the cd of mri.  Confirmed that we have it and we have him on for tumor board next week to discuss plan going forward.  [place] of [place] 2 w[place], he will call us when he returns for plan.

## 2017-05-29 NOTE — TELEPHONE ENCOUNTER
----- Message from Jeanie Washburn sent at 5/26/2017  3:41 PM CDT -----  Contact: Patient  RR/NEW- Patient would like to speak to Leonarda he has a few questions.Patient can be reached at [phone]

## 2017-06-06 ENCOUNTER — CONFERENCE (OUTPATIENT)
Dept: NEUROLOGY | Facility: HOSPITAL | Age: 82
End: 2017-06-06
Payer: MEDICARE

## 2017-06-06 NOTE — TELEPHONE ENCOUNTER
Multidisciplinary NET Tumor Board Summary:    Attendees:    Surgery -  Bogdan Waterman MD,   FRANCINE Vizcarra MD &   SHANTE Braun MD--absent  Interventional Radiology - Jayme Tinsley MD  Pathology - Mariama Garcia MD--absent  Oncology - Christo Rodriguez MD--absent  Gastroenterology - Not present     Case Presentation:       Discussion: Reviewed medical history, scans and lab work.      Recommendations:   1. Recheck chemistries with local doctor  2. Is another candidate for super selective TACE but no guarantee that his side effects will be better

## 2017-06-15 ENCOUNTER — TELEPHONE (OUTPATIENT)
Dept: NEUROLOGY | Facility: HOSPITAL | Age: 82
End: 2017-06-15

## 2017-06-15 DIAGNOSIS — C7B.8 SECONDARY NEUROENDOCRINE TUMOR OF LIVER: ICD-10-CM

## 2017-06-15 DIAGNOSIS — C7A.8 PRIMARY MALIGNANT NEUROENDOCRINE NEOPLASM OF ILEUM: Primary | ICD-10-CM

## 2017-06-15 NOTE — TELEPHONE ENCOUNTER
Spoke with pt, informed of recommendation of additional TACE.  He is agreeable to proceed.  He feels at 100% at this time.  Informed that there is no guarantee that his symptoms will be less with a super selective TACE. He verbalized understanding.  He is recommending Dr. Jeffrey for the procedure. Explained that he is usually here but no guarantee he will be the MD, he verbalized understanding and said he would be willing to change dates if necessary.

## 2017-06-15 NOTE — TELEPHONE ENCOUNTER
----- Message from Katty Matiass sent at 6/15/2017  8:32 AM CDT -----  EAW- Patient would like to speak with Denise. Please call back at [phone] to assist.

## 2017-06-19 ENCOUNTER — PATIENT MESSAGE (OUTPATIENT)
Dept: NEUROLOGY | Facility: HOSPITAL | Age: 82
End: 2017-06-19

## 2017-06-20 ENCOUNTER — TELEPHONE (OUTPATIENT)
Dept: NEUROLOGY | Facility: HOSPITAL | Age: 82
End: 2017-06-20

## 2017-06-20 NOTE — TELEPHONE ENCOUNTER
----- Message from Katty Mir sent at 6/20/2017  4:19 PM CDT -----  EAW- Patient sent a message through the portal to Dr. Vergara and he just wanted to make sure he received it. Please let Dr. Waterman know.

## 2017-06-27 ENCOUNTER — TELEPHONE (OUTPATIENT)
Dept: NEUROLOGY | Facility: HOSPITAL | Age: 82
End: 2017-06-27

## 2017-06-27 NOTE — TELEPHONE ENCOUNTER
----- Message from Adwoa Reaves sent at 6/27/2017  2:35 PM CDT -----  Contact: Patient  EAW- Patient has questions about Creon. [facility] stating  he takes the medication 2 pills 3 times daily. [facility] in [place] [phone]. Patient can be reached at [phone] if you have any questions.

## 2017-06-27 NOTE — TELEPHONE ENCOUNTER
"Returned call to patient, he will like a new RX in the next couple of weeks sent to "[email or web address]" for the creon to be taken tid. The cost is $150 for 90 days instead of $1100 for the current RX. I spoke to the [facility] pharmacy, she quoted $1000 copay for the new RX. Pt was notified, he is going to contact the insurance and "[email or web address]" to get numbers and correct information of where to send new RX, and will return call. He currently has a 90 day supply. He will call back with information.  "

## 2017-07-03 ENCOUNTER — TELEPHONE (OUTPATIENT)
Dept: NEUROLOGY | Facility: HOSPITAL | Age: 82
End: 2017-07-03

## 2017-07-03 NOTE — TELEPHONE ENCOUNTER
----- Message from Jeanie Washburn sent at 7/3/2017  9:09 AM CDT -----  Contact: Patient  QUENTIN- Patient wanted to send Denise Pak a message that he received her message about Dr. Tinsley being available and he will see you on July 13 & 14

## 2017-07-07 ENCOUNTER — TELEPHONE (OUTPATIENT)
Dept: NEUROLOGY | Facility: HOSPITAL | Age: 82
End: 2017-07-07

## 2017-07-07 NOTE — TELEPHONE ENCOUNTER
----- Message from Adwoa Reaves sent at 7/7/2017 11:41 AM CDT -----  Contact: Patient  EAW- Patient  wants to see if he can get his Lanreotide injection at [facility] on July 13, 2017. Please call patient back at [phone].

## 2017-07-11 ENCOUNTER — TELEPHONE (OUTPATIENT)
Dept: NEUROLOGY | Facility: HOSPITAL | Age: 82
End: 2017-07-11

## 2017-07-11 RX ORDER — CYANOCOBALAMIN 1000 UG/ML
1000 INJECTION, SOLUTION INTRAMUSCULAR; SUBCUTANEOUS
Status: CANCELLED
Start: 2017-08-01

## 2017-07-11 NOTE — TELEPHONE ENCOUNTER
----- Message from Adwoa Reaves sent at 7/11/2017  1:19 PM CDT -----  Contact: Patient  QUENTIN- Patient needs to have labs appointment  On 7/13/17 moved to 11:15 and injection to 11:45. Patient also gets B 12 injection with the Lanreotide and wants to make sure that has been ordered. Patient can be reached at [phone].

## 2017-07-13 ENCOUNTER — LAB VISIT (OUTPATIENT)
Dept: LAB | Facility: HOSPITAL | Age: 82
End: 2017-07-13
Attending: SURGERY
Payer: MEDICARE

## 2017-07-13 ENCOUNTER — INFUSION (OUTPATIENT)
Dept: INFUSION THERAPY | Facility: HOSPITAL | Age: 82
End: 2017-07-13
Attending: SURGERY
Payer: MEDICARE

## 2017-07-13 ENCOUNTER — OFFICE VISIT (OUTPATIENT)
Dept: NEUROLOGY | Facility: HOSPITAL | Age: 82
End: 2017-07-13
Attending: SURGERY
Payer: MEDICARE

## 2017-07-13 VITALS
WEIGHT: 181 LBS | HEIGHT: 69 IN | HEART RATE: 42 BPM | SYSTOLIC BLOOD PRESSURE: 135 MMHG | BODY MASS INDEX: 26.81 KG/M2 | TEMPERATURE: 96 F | DIASTOLIC BLOOD PRESSURE: 76 MMHG

## 2017-07-13 DIAGNOSIS — C7B.8 SECONDARY NEUROENDOCRINE TUMOR OF LIVER: ICD-10-CM

## 2017-07-13 DIAGNOSIS — C7A.8 PRIMARY MALIGNANT NEUROENDOCRINE NEOPLASM OF ILEUM: ICD-10-CM

## 2017-07-13 DIAGNOSIS — C7B.02 SECONDARY MALIGNANT CARCINOID TUMOR OF LIVER: ICD-10-CM

## 2017-07-13 DIAGNOSIS — C7B.02 METASTATIC MALIGNANT CARCINOID TUMOR TO LIVER: ICD-10-CM

## 2017-07-13 DIAGNOSIS — C7B.8 SECONDARY NEUROENDOCRINE TUMOR OF LIVER: Primary | ICD-10-CM

## 2017-07-13 DIAGNOSIS — C7A.012 MALIGNANT CARCINOID TUMOR OF ILEUM: ICD-10-CM

## 2017-07-13 DIAGNOSIS — C7B.01 SECONDARY CARCINOID TUMOR OF DISTANT LYMPH NODES: ICD-10-CM

## 2017-07-13 LAB
ALBUMIN SERPL BCP-MCNC: 3.5 G/DL
ALP SERPL-CCNC: 818 U/L
ALT SERPL W/O P-5'-P-CCNC: 68 U/L
ANION GAP SERPL CALC-SCNC: 10 MMOL/L
AST SERPL-CCNC: 81 U/L
BASOPHILS # BLD AUTO: 0.02 K/UL
BASOPHILS NFR BLD: 0.3 %
BILIRUB SERPL-MCNC: 0.7 MG/DL
BUN SERPL-MCNC: 16 MG/DL
CALCIUM SERPL-MCNC: 9.9 MG/DL
CHLORIDE SERPL-SCNC: 106 MMOL/L
CO2 SERPL-SCNC: 24 MMOL/L
CREAT SERPL-MCNC: 1 MG/DL
DIFFERENTIAL METHOD: ABNORMAL
EOSINOPHIL # BLD AUTO: 0.3 K/UL
EOSINOPHIL NFR BLD: 3.9 %
ERYTHROCYTE [DISTWIDTH] IN BLOOD BY AUTOMATED COUNT: 14.5 %
EST. GFR  (AFRICAN AMERICAN): >60 ML/MIN/1.73 M^2
EST. GFR  (NON AFRICAN AMERICAN): >60 ML/MIN/1.73 M^2
GLUCOSE SERPL-MCNC: 79 MG/DL
HCT VFR BLD AUTO: 40.2 %
HGB BLD-MCNC: 12.9 G/DL
INR PPP: 1.1
LYMPHOCYTES # BLD AUTO: 0.9 K/UL
LYMPHOCYTES NFR BLD: 14.4 %
MCH RBC QN AUTO: 29.5 PG
MCHC RBC AUTO-ENTMCNC: 32.1 %
MCV RBC AUTO: 92 FL
MONOCYTES # BLD AUTO: 0.5 K/UL
MONOCYTES NFR BLD: 7.4 %
NEUTROPHILS # BLD AUTO: 4.7 K/UL
NEUTROPHILS NFR BLD: 73.8 %
PLATELET # BLD AUTO: 132 K/UL
PMV BLD AUTO: 12.9 FL
POTASSIUM SERPL-SCNC: 3.8 MMOL/L
PROT SERPL-MCNC: 7.7 G/DL
PROTHROMBIN TIME: 12.2 SEC
RBC # BLD AUTO: 4.38 M/UL
SODIUM SERPL-SCNC: 140 MMOL/L
WBC # BLD AUTO: 6.38 K/UL

## 2017-07-13 PROCEDURE — 99215 OFFICE O/P EST HI 40 MIN: CPT | Mod: 25 | Performed by: SURGERY

## 2017-07-13 RX ORDER — ONDANSETRON 4 MG/1
4 TABLET, FILM COATED ORAL EVERY 8 HOURS PRN
Qty: 15 TABLET | Refills: 1 | Status: ON HOLD | OUTPATIENT
Start: 2017-07-13 | End: 2017-07-15

## 2017-07-13 RX ORDER — DEXTROSE MONOHYDRATE, SODIUM CHLORIDE, AND POTASSIUM CHLORIDE 50; 1.49; 4.5 G/1000ML; G/1000ML; G/1000ML
INJECTION, SOLUTION INTRAVENOUS CONTINUOUS
Status: CANCELLED | OUTPATIENT
Start: 2017-07-13

## 2017-07-13 RX ORDER — LANREOTIDE ACETATE 120 MG/.5ML
120 INJECTION SUBCUTANEOUS ONCE
Status: CANCELLED | OUTPATIENT
Start: 2017-08-01 | End: 2017-08-01

## 2017-07-13 RX ORDER — CYANOCOBALAMIN 1000 UG/ML
1000 INJECTION, SOLUTION INTRAMUSCULAR; SUBCUTANEOUS
Status: COMPLETED | OUTPATIENT
Start: 2017-07-13 | End: 2017-07-13

## 2017-07-13 RX ORDER — IBUPROFEN 400 MG/1
400 TABLET ORAL EVERY 4 HOURS PRN
Status: CANCELLED | OUTPATIENT
Start: 2017-07-13

## 2017-07-13 RX ORDER — ONDANSETRON 2 MG/ML
4 INJECTION INTRAMUSCULAR; INTRAVENOUS EVERY 8 HOURS PRN
Status: CANCELLED | OUTPATIENT
Start: 2017-07-13

## 2017-07-13 RX ORDER — SODIUM CHLORIDE 9 MG/ML
500 INJECTION, SOLUTION INTRAVENOUS ONCE
Status: CANCELLED | OUTPATIENT
Start: 2017-07-13 | End: 2017-07-13

## 2017-07-13 RX ORDER — CHOLECALCIFEROL (VITAMIN D3) 25 MCG
TABLET,CHEWABLE ORAL
Refills: 0 | COMMUNITY
Start: 2017-05-18

## 2017-07-13 RX ORDER — LIDOCAINE HYDROCHLORIDE 10 MG/ML
1 INJECTION, SOLUTION EPIDURAL; INFILTRATION; INTRACAUDAL; PERINEURAL ONCE
Status: CANCELLED | OUTPATIENT
Start: 2017-07-13 | End: 2017-07-13

## 2017-07-13 RX ORDER — CIPROFLOXACIN 500 MG/1
500 TABLET ORAL 2 TIMES DAILY
Qty: 14 TABLET | Refills: 0 | Status: ON HOLD | OUTPATIENT
Start: 2017-07-13 | End: 2017-07-15

## 2017-07-13 RX ORDER — ALLOPURINOL 300 MG/1
300 TABLET ORAL DAILY
Status: CANCELLED | OUTPATIENT
Start: 2017-07-13 | End: 2017-07-15

## 2017-07-13 RX ORDER — OXYCODONE AND ACETAMINOPHEN 5; 325 MG/1; MG/1
1 TABLET ORAL EVERY 4 HOURS PRN
Qty: 25 TABLET | Refills: 0 | Status: ON HOLD | OUTPATIENT
Start: 2017-07-13 | End: 2017-07-15 | Stop reason: HOSPADM

## 2017-07-13 RX ORDER — HYDROMORPHONE HYDROCHLORIDE 1 MG/ML
0.2 INJECTION, SOLUTION INTRAMUSCULAR; INTRAVENOUS; SUBCUTANEOUS
Status: CANCELLED | OUTPATIENT
Start: 2017-07-13

## 2017-07-13 RX ORDER — FENTANYL CITRATE 50 UG/ML
50 INJECTION, SOLUTION INTRAMUSCULAR; INTRAVENOUS ONCE
Status: CANCELLED | OUTPATIENT
Start: 2017-07-13 | End: 2017-07-13

## 2017-07-13 RX ORDER — OXYCODONE AND ACETAMINOPHEN 5; 325 MG/1; MG/1
2 TABLET ORAL EVERY 6 HOURS PRN
Status: CANCELLED | OUTPATIENT
Start: 2017-07-13

## 2017-07-13 RX ORDER — ASPIRIN 81 MG/1
TABLET ORAL
COMMUNITY

## 2017-07-13 RX ORDER — LANREOTIDE ACETATE 120 MG/.5ML
120 INJECTION SUBCUTANEOUS ONCE
Status: COMPLETED | OUTPATIENT
Start: 2017-07-13 | End: 2017-07-13

## 2017-07-13 RX ORDER — CYANOCOBALAMIN 1000 UG/ML
1000 INJECTION, SOLUTION INTRAMUSCULAR; SUBCUTANEOUS
Status: CANCELLED
Start: 2017-08-01

## 2017-07-13 RX ORDER — ESZOPICLONE 1 MG/1
TABLET, FILM COATED ORAL
COMMUNITY
Start: 2017-06-13

## 2017-07-13 RX ORDER — MAGNESIUM SULFATE/D5W 2 G/50 ML
2 INTRAVENOUS SOLUTION, PIGGYBACK (ML) INTRAVENOUS ONCE
Status: CANCELLED | OUTPATIENT
Start: 2017-07-13 | End: 2017-07-13

## 2017-07-13 RX ORDER — FAMOTIDINE 20 MG/1
20 TABLET, FILM COATED ORAL EVERY 12 HOURS
Status: CANCELLED | OUTPATIENT
Start: 2017-07-13

## 2017-07-13 RX ORDER — DEXAMETHASONE SODIUM PHOSPHATE 4 MG/ML
8 INJECTION, SOLUTION INTRA-ARTICULAR; INTRALESIONAL; INTRAMUSCULAR; INTRAVENOUS; SOFT TISSUE ONCE
Status: CANCELLED | OUTPATIENT
Start: 2017-07-13 | End: 2017-07-13

## 2017-07-13 RX ORDER — MIDAZOLAM HYDROCHLORIDE 1 MG/ML
0.5 INJECTION INTRAMUSCULAR; INTRAVENOUS ONCE
Status: CANCELLED | OUTPATIENT
Start: 2017-07-13 | End: 2017-07-13

## 2017-07-13 RX ORDER — DIPHENHYDRAMINE HYDROCHLORIDE 50 MG/ML
50 INJECTION INTRAMUSCULAR; INTRAVENOUS ONCE
Status: CANCELLED | OUTPATIENT
Start: 2017-07-13 | End: 2017-07-13

## 2017-07-13 RX ORDER — FUROSEMIDE 10 MG/ML
20 INJECTION INTRAMUSCULAR; INTRAVENOUS ONCE
Status: CANCELLED | OUTPATIENT
Start: 2017-07-13 | End: 2017-07-13

## 2017-07-13 RX ADMIN — CYANOCOBALAMIN 1000 MCG: 1000 INJECTION, SOLUTION INTRAMUSCULAR; SUBCUTANEOUS at 11:07

## 2017-07-13 RX ADMIN — LANREOTIDE ACETATE 120 MG: 120 INJECTION SUBCUTANEOUS at 11:07

## 2017-07-13 NOTE — NURSING
Pt tolerated B-12 IM Injection to Right Deltoid. Pt also given SQ Lanreotide Injection to Left upper outer gluteal region of buttock. Pt tolerated well. AVS printed including education on b-12 and Lanreotide. Pt verbalized understanding of education provided.

## 2017-07-13 NOTE — PROGRESS NOTES
"[place]:  [place] [facility]  A collaboration between [facility] and [facility]      PATIENT: Delmar Mckee  MRN: [id number]  DATE: 7/13/2017    Subjective:      Chief Complaint: Follow-up (TACE -pre visit)  is here for superselective embolization    Took little long time to recover    Now getting better    Had vacation in [facility]    Vitals:   Vitals:    07/13/17 1140   BP: 135/76   Pulse: (!) 42   Temp: 96.1 °F (35.6 °C)   TempSrc: Oral   Weight: 82.1 kg (181 lb)   Height: 5' 9" (1.753 m)        Karnofsky Score:     Diagnosis: No diagnosis found.     Oncologic History:     Interval History:     Past Medical History:  Past Medical History:   Diagnosis Date    Diabetes mellitus type II, controlled     Drug therapy     lanreotide 6/2016    HTN (hypertension) 2016    Hypothyroidism     Malignant carcinoid tumor of ileum 2009       Past Surgical History:  Past Surgical History:   Procedure Laterality Date    ABDOMINAL SURGERY  2009    bland embo  2012, 2013    CARDIAC ELECTROPHYSIOLOGY STUDY AND ABLATION      drug therapy      sandostatin 30 mg every 28 days    drug trial  2015 summer    pazopanib    nanoknofe  2011    RFA  2011    Pt denies having in 2012, 2013 (7/26/16)    ROTATOR CUFF REPAIR      SINUS SURGERY      TACE  9/2016, 3/2017    [facility]       Family History:  Family History   Problem Relation Age of Onset    Cancer Father      colon       Allergies:  Review of patient's allergies indicates:   Allergen Reactions    Epinephrine Anaphylaxis     Can cause Carcinoid Crisis    Latex, natural rubber Rash       Medications:  Current Outpatient Prescriptions   Medication Sig Dispense Refill    atorvastatin (LIPITOR) 10 MG tablet 5 mg every evening.       cholecalciferol, vitamin D3, 2,000 unit Tab Take 1 tablet by mouth once daily.       cholestyramine (QUESTRAN) 4 gram packet TAKE 1 PACKET once daily  0 "    co-enzyme Q-10 30 mg capsule Take 30 mg by mouth once daily.       CREON 24,000-76,000 -120,000 unit capsule Take 2 capsules by mouth 4 (four) times daily with meals and nightly. (Patient taking differently: Take 2 capsules by mouth 3 (three) times daily with meals. ) 900 capsule 3    eszopiclone (LUNESTA) 1 MG Tab Take by mouth.      lactulose (CHRONULAC) 10 gram/15 mL solution Take 10 g by mouth as needed.       lanreotide (SOMATULINE DEPOT) 120 mg/0.5 mL Syrg Inject 120 mg into the skin every 28 days.      levothyroxine (SYNTHROID, LEVOTHROID) 175 MCG tablet Take 175 mcg by mouth once daily.  8    metformin (GLUCOPHAGE) 850 MG tablet Take 850 mg by mouth 2 (two) times daily with meals.   3    multivitamin (THERAGRAN) per tablet Take 1 tablet by mouth once daily.       nateglinide (STARLIX) 60 MG tablet Take 60 mg by mouth 3 (three) times daily before meals.      ondansetron (ZOFRAN-ODT) 4 MG TbDL Take 1 tablet (4 mg total) by mouth every 6 (six) hours as needed. 20 tablet 0    ramipril (ALTACE) 10 MG capsule 2 (two) times daily.       saw palmetto fruit 450 mg Cap Take 1 capsule by mouth 2 (two) times daily.       temazepam (RESTORIL) 15 mg Cap 15 mg every evening.       terazosin (HYTRIN) 1 MG capsule 1 mg every evening.       tramadol (ULTRAM) 50 mg tablet Take 1 tablet (50 mg total) by mouth every 6 (six) hours as needed for Pain. 30 tablet 0    vitamin A 8000 UNIT capsule Take 8,000 Units by mouth.      VITAMIN B-12 1,000 mcg TbSR PLACE 1 TABLET UNDER TONGUE DAILY  0    aspirin (ECOTRIN) 81 MG EC tablet Take by mouth.      cinnamon bark 500 mg capsule Take by mouth.       No current facility-administered medications for this visit.        Review of Systems   Constitutional: Positive for appetite change and fatigue. Negative for activity change, chills, diaphoresis, fever and unexpected weight change.   HENT: Negative for congestion, dental problem, drooling, ear discharge, ear pain,  facial swelling, hearing loss, mouth sores, nosebleeds, postnasal drip, rhinorrhea, sinus pressure, sneezing, sore throat, tinnitus, trouble swallowing and voice change.    Eyes: Negative for photophobia, pain, discharge, redness, itching and visual disturbance.   Respiratory: Negative for apnea, cough, choking, chest tightness, shortness of breath, wheezing and stridor.    Cardiovascular: Negative for chest pain, palpitations and leg swelling.   Gastrointestinal: Negative for abdominal pain, anal bleeding, blood in stool, constipation and diarrhea.   Endocrine: Negative.    Genitourinary: Negative.    Musculoskeletal: Negative.    Allergic/Immunologic: Negative.    Neurological: Negative.    Hematological: Negative.    Psychiatric/Behavioral: Negative.       Objective:      Physical Exam   Assessment:       No diagnosis found.    Laboratory Data:   Results for ROWDY DE LA PAZ CLEVELAND (MRN [id number]) as of 7/13/2017 12:04   Ref. Range 5/2/2017 00:00 5/26/2017 00:00 7/13/2017 10:30   WBC Latest Ref Range: 3.90 - 12.70 K/uL   6.38   RBC Latest Ref Range: 4.60 - 6.20 M/uL   4.38 (L)   Hemoglobin Latest Ref Range: 14.0 - 18.0 g/dL   12.9 (L)   Hematocrit Latest Ref Range: 40.0 - 54.0 %   40.2   MCV Latest Ref Range: 82 - 98 fL   92   MCH Latest Ref Range: 27.0 - 31.0 pg   29.5   MCHC Latest Ref Range: 32.0 - 36.0 %   32.1   RDW Latest Ref Range: 11.5 - 14.5 %   14.5   Platelets Latest Ref Range: 150 - 350 K/uL   132 (L)   MPV Latest Ref Range: 9.2 - 12.9 fL   12.9   Gran% Latest Ref Range: 38.0 - 73.0 %   73.8 (H)   Gran # Latest Ref Range: 1.8 - 7.7 K/uL   4.7   Lymph% Latest Ref Range: 18.0 - 48.0 %   14.4 (L)   Lymph # Latest Ref Range: 1.0 - 4.8 K/uL   0.9 (L)   Mono% Latest Ref Range: 4.0 - 15.0 %   7.4   Mono # Latest Ref Range: 0.3 - 1.0 K/uL   0.5   Eosinophil% Latest Ref Range: 0.0 - 8.0 %   3.9   Eos # Latest Ref Range: 0.0 - 0.5 K/uL   0.3   Basophil% Latest Ref Range: 0.0 - 1.9 %   0.3   Baso # Latest Ref Range: 0.00  "- 0.20 K/uL   0.02   EXT 5 HIAA BLOOD Latest Ref Range: 0 - 22 ng/ml 646 (A)     EXT Albumin Latest Ref Range: 3.2 - 5.5 g/dl  3.9    EXT Alkaline Phosphatase Latest Ref Range: 42 - 121 u/l  602 (A)    EXT ALT Latest Ref Range: 10 - 60 u/l  66 (A)    EXT AST Latest Ref Range: 10 - 42 u/l  82 (A)    EXT BilirubiN Total Latest Ref Range: 0.2 - 1.0 mg/dl  1.1 (A)    EXT BUN Latest Ref Range: 6 - 20 mg/dl  18    EXT Calcium Latest Ref Range: 8.5 - 10.5 mg/dl  9.5    EXT Chloride Latest Ref Range: 101 - 111 mmol/l  106    EXT CHROMOGRANIN A Latest Ref Range: 0 - 93 ng/ml  654 (A)    EXT CO2 Latest Ref Range: 21.0 - 31.0 mmol/l  24.9    EXT Creatinine Latest Ref Range: 0.50 - 1.20 mg/dl  1.05    EXT Glucose Latest Ref Range: 70 - 115 mg/dl  152 (A)    EXT Hematocrit Latest Ref Range: 41.0 - 53.0 %  38.7 (A)    EXT Hemoglobin Latest Ref Range: 13.5 - 17.6 g/dl  12.4 (A)    EXT NEUROKININ A LIZETTE Latest Ref Range: 0 - 40 pg/ml 11     EXT PANCREASTATIN LIZETTE Latest Ref Range: 10 - 135 pg/ml 836 (A)     EXT Platelets Latest Ref Range: 150 - 440 10"9/l  134 (A)    EXT Potassium Latest Ref Range: 3.6 - 5.0 mmol/l  3.7    EXT Protein total Latest Ref Range: 6.7 - 8.2 g/dl  7.4    EXT SEROTONIN Latest Ref Range: 56 - 244 ng/ml 931 (A)     EXT Sodium Latest Ref Range: 135 - 145 mmol/l  141    EXT WBC Latest Ref Range: 4.6 - 11.0 10"9/l  5.2        Impression:   Plan:       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 [phone]; [phone]   fax. [phone]  "

## 2017-07-13 NOTE — PATIENT INSTRUCTIONS
Nothing to eat or drink after midnight tonight.    Someone will contact you by the end of the day today to give you an arrival time for procedure--you will check in at the 1st floor hospital admission desk at the time given to you.     *Remember to have labs (CBC & CMP) 10 days after TACE procedure- orders given to patient

## 2017-07-13 NOTE — PATIENT INSTRUCTIONS
Cyanocobalamin, Vitamin B12 injection  What is this medicine?  CYANOCOBALAMIN (sye an oh koe BAL a min) is a man made form of vitamin B12. Vitamin B12 is used in the growth of healthy blood cells, nerve cells, and proteins in the body. It also helps with the metabolism of fats and carbohydrates. This medicine is used to treat people who can not absorb vitamin B12.  How should I use this medicine?  This medicine is injected into a muscle or deeply under the skin. It is usually given by a health care professional in a clinic or doctor's office. However, your doctor may teach you how to inject yourself. Follow all instructions.  Talk to your pediatrician regarding the use of this medicine in children. Special care may be needed.  What side effects may I notice from receiving this medicine?  Side effects that you should report to your doctor or health care professional as soon as possible:  · allergic reactions like skin rash, itching or hives, swelling of the face, lips, or tongue  · blue tint to skin  · chest tightness, pain  · difficulty breathing, wheezing  · dizziness  · red, swollen painful area on the leg  Side effects that usually do not require medical attention (report to your doctor or health care professional if they continue or are bothersome):  · diarrhea  · headache  What may interact with this medicine?  · colchicine  · heavy alcohol intake  What if I miss a dose?  If you are given your dose at a clinic or doctor's office, call to reschedule your appointment. If you give your own injections and you miss a dose, take it as soon as you can. If it is almost time for your next dose, take only that dose. Do not take double or extra doses.  Where should I keep my medicine?  Keep out of the reach of children.  Store at room temperature between 15 and 30 degrees C (59 and 85 degrees F). Protect from light. Throw away any unused medicine after the expiration date.  What should I tell my health care provider  before I take this medicine?  They need to know if you have any of these conditions:  · kidney disease  · Willy's disease  · megaloblastic anemia  · an unusual or allergic reaction to cyanocobalamin, cobalt, other medicines, foods, dyes, or preservatives  · pregnant or trying to get pregnant  · breast-feeding  What should I watch for while using this medicine?  Visit your doctor or health care professional regularly. You may need blood work done while you are taking this medicine.  You may need to follow a special diet. Talk to your doctor. Limit your alcohol intake and avoid smoking to get the best benefit.  Date Last Reviewed:   NOTE:This sheet is a summary. It may not cover all possible information. If you have questions about this medicine, talk to your doctor, pharmacist, or health care provider. Copyright© 2016 [employer]        Lanreotide injection  What is this medicine?  LANREOTIDE (lawanda REE oh tide) is used to reduce blood levels of growth hormone in patients with a condition called acromegaly. It also works to slow or stop tumor growth in patients with gastroenteropancreatic neuroendocrine tumor (GEP-NET).  How should I use this medicine?  This medicine is for injection under the skin. It is given by a health care professional in a hospital or clinic setting.  Contact your pediatrician or health care professional regarding the use of this medicine in children. Special care may be needed.  What side effects may I notice from receiving this medicine?  Side effects that you should report to your doctor or health care professional as soon as possible:  · allergic reactions like skin rash, itching or hives, swelling of the face, lips, or tongue  · changes in blood sugar  · changes in heart rate  · severe stomach pain  Side effects that usually do not require medical attention (report to your doctor or health care professional if they continue or are bothersome):  · diarrhea or constipation  · gas or stomach  pain  · nausea, vomiting  · pain, redness, swelling and irritation at site where injected  What may interact with this medicine?  · bromocriptine  · cyclosporine  · medicines for diabetes, including insulin  · medicines for heart disease or hypertension  · quinidine  What if I miss a dose?  It is important not to miss your dose. Call your doctor or health care professional if you are unable to keep an appointment.  Where should I keep my medicine?  This drug is given in a hospital or clinic and will not be stored at home.  What should I tell my health care provider before I take this medicine?  They need to know if you have any of these conditions:  · diabetes  · gallbladder disease  · heart disease  · kidney disease  · liver disease  · an unusual or allergic reaction to lanreotide, other medicines, latex, foods, dyes, or preservatives  · pregnant or trying to get pregnant  · breast-feeding  What should I watch for while using this medicine?  Visit your doctor or health care professional for regular checks on your progress. Your condition will be monitored carefully while you are receiving this medicine.  This medicine may cause increases or decreases in blood sugar. Signs of high blood sugar include frequent urination, unusual thirst, flushed or dry skin, difficulty breathing, drowsiness, stomach ache, nausea, vomiting or dry mouth. Signs of low blood sugar include chills, cool, pale skin or cold sweats, drowsiness, extreme hunger, fast heartbeat, headache, nausea, nervousness or anxiety, shakiness, trembling, unsteadiness, tiredness, or weakness. Contact your doctor or health care professional right away if you experience any of these symptoms.  Date Last Reviewed:   NOTE:This sheet is a summary. It may not cover all possible information. If you have questions about this medicine, talk to your doctor, pharmacist, or health care provider. Copyright© 2016 [employer]

## 2017-07-14 ENCOUNTER — HOSPITAL ENCOUNTER (OUTPATIENT)
Facility: HOSPITAL | Age: 82
LOS: 1 days | Discharge: HOME | End: 2017-07-15
Attending: SURGERY | Admitting: SURGERY
Payer: MEDICARE

## 2017-07-14 DIAGNOSIS — C7B.02 METASTATIC MALIGNANT CARCINOID TUMOR TO LIVER: ICD-10-CM

## 2017-07-14 DIAGNOSIS — C7A.8 PRIMARY MALIGNANT NEUROENDOCRINE NEOPLASM OF ILEUM: ICD-10-CM

## 2017-07-14 DIAGNOSIS — C7B.8 SECONDARY NEUROENDOCRINE TUMOR OF LIVER: ICD-10-CM

## 2017-07-14 LAB
POCT GLUCOSE: 151 MG/DL (ref 70–110)
POCT GLUCOSE: 176 MG/DL (ref 70–110)
POCT GLUCOSE: 198 MG/DL (ref 70–110)

## 2017-07-14 PROCEDURE — 25000003 PHARM REV CODE 250: Performed by: SURGERY

## 2017-07-14 PROCEDURE — 63600175 PHARM REV CODE 636 W HCPCS: Performed by: RADIOLOGY

## 2017-07-14 PROCEDURE — 63600175 PHARM REV CODE 636 W HCPCS: Performed by: SURGERY

## 2017-07-14 PROCEDURE — 94761 N-INVAS EAR/PLS OXIMETRY MLT: CPT

## 2017-07-14 PROCEDURE — 25500020 PHARM REV CODE 255: Performed by: SURGERY

## 2017-07-14 PROCEDURE — 94799 UNLISTED PULMONARY SVC/PX: CPT

## 2017-07-14 PROCEDURE — 25000003 PHARM REV CODE 250: Performed by: RADIOLOGY

## 2017-07-14 RX ORDER — SODIUM CHLORIDE 9 MG/ML
500 INJECTION, SOLUTION INTRAVENOUS ONCE
Status: COMPLETED | OUTPATIENT
Start: 2017-07-14 | End: 2017-07-14

## 2017-07-14 RX ORDER — MIDAZOLAM HYDROCHLORIDE 1 MG/ML
INJECTION, SOLUTION INTRAMUSCULAR; INTRAVENOUS CODE/TRAUMA/SEDATION MEDICATION
Status: COMPLETED | OUTPATIENT
Start: 2017-07-14 | End: 2017-07-14

## 2017-07-14 RX ORDER — DEXAMETHASONE SODIUM PHOSPHATE 4 MG/ML
8 INJECTION, SOLUTION INTRA-ARTICULAR; INTRALESIONAL; INTRAMUSCULAR; INTRAVENOUS; SOFT TISSUE ONCE
Status: COMPLETED | OUTPATIENT
Start: 2017-07-14 | End: 2017-07-14

## 2017-07-14 RX ORDER — DEXTROSE MONOHYDRATE, SODIUM CHLORIDE, AND POTASSIUM CHLORIDE 50; 1.49; 4.5 G/1000ML; G/1000ML; G/1000ML
INJECTION, SOLUTION INTRAVENOUS CONTINUOUS
Status: DISCONTINUED | OUTPATIENT
Start: 2017-07-14 | End: 2017-07-14

## 2017-07-14 RX ORDER — DEXTROSE MONOHYDRATE, SODIUM CHLORIDE, AND POTASSIUM CHLORIDE 50; 1.49; 4.5 G/1000ML; G/1000ML; G/1000ML
INJECTION, SOLUTION INTRAVENOUS CONTINUOUS
Status: DISCONTINUED | OUTPATIENT
Start: 2017-07-14 | End: 2017-07-15 | Stop reason: HOSPADM

## 2017-07-14 RX ORDER — HYDRALAZINE HYDROCHLORIDE 20 MG/ML
10 INJECTION INTRAMUSCULAR; INTRAVENOUS ONCE
Status: DISCONTINUED | OUTPATIENT
Start: 2017-07-14 | End: 2017-07-15 | Stop reason: HOSPADM

## 2017-07-14 RX ORDER — ALLOPURINOL 300 MG/1
300 TABLET ORAL DAILY
Status: COMPLETED | OUTPATIENT
Start: 2017-07-14 | End: 2017-07-15

## 2017-07-14 RX ORDER — FUROSEMIDE 10 MG/ML
20 INJECTION INTRAMUSCULAR; INTRAVENOUS ONCE
Status: COMPLETED | OUTPATIENT
Start: 2017-07-14 | End: 2017-07-14

## 2017-07-14 RX ORDER — LIDOCAINE HYDROCHLORIDE 10 MG/ML
INJECTION INFILTRATION; PERINEURAL CODE/TRAUMA/SEDATION MEDICATION
Status: COMPLETED | OUTPATIENT
Start: 2017-07-14 | End: 2017-07-14

## 2017-07-14 RX ORDER — HYDRALAZINE HYDROCHLORIDE 20 MG/ML
10 INJECTION INTRAMUSCULAR; INTRAVENOUS EVERY 6 HOURS PRN
Status: DISCONTINUED | OUTPATIENT
Start: 2017-07-14 | End: 2017-07-15 | Stop reason: HOSPADM

## 2017-07-14 RX ORDER — MAGNESIUM SULFATE HEPTAHYDRATE 40 MG/ML
2 INJECTION, SOLUTION INTRAVENOUS ONCE
Status: COMPLETED | OUTPATIENT
Start: 2017-07-14 | End: 2017-07-14

## 2017-07-14 RX ORDER — FAMOTIDINE 20 MG/1
20 TABLET, FILM COATED ORAL EVERY 12 HOURS
Status: DISCONTINUED | OUTPATIENT
Start: 2017-07-14 | End: 2017-07-15 | Stop reason: HOSPADM

## 2017-07-14 RX ORDER — NATEGLINIDE 60 MG/1
60 TABLET ORAL
Status: DISCONTINUED | OUTPATIENT
Start: 2017-07-15 | End: 2017-07-15 | Stop reason: HOSPADM

## 2017-07-14 RX ORDER — FENTANYL CITRATE 50 UG/ML
INJECTION, SOLUTION INTRAMUSCULAR; INTRAVENOUS CODE/TRAUMA/SEDATION MEDICATION
Status: COMPLETED | OUTPATIENT
Start: 2017-07-14 | End: 2017-07-14

## 2017-07-14 RX ORDER — ENOXAPARIN SODIUM 100 MG/ML
40 INJECTION SUBCUTANEOUS EVERY 24 HOURS
Status: DISCONTINUED | OUTPATIENT
Start: 2017-07-15 | End: 2017-07-15 | Stop reason: HOSPADM

## 2017-07-14 RX ORDER — LIDOCAINE HYDROCHLORIDE 10 MG/ML
1 INJECTION, SOLUTION EPIDURAL; INFILTRATION; INTRACAUDAL; PERINEURAL ONCE
Status: DISCONTINUED | OUTPATIENT
Start: 2017-07-14 | End: 2017-07-14 | Stop reason: HOSPADM

## 2017-07-14 RX ORDER — TERAZOSIN 1 MG/1
1 CAPSULE ORAL NIGHTLY
Status: DISCONTINUED | OUTPATIENT
Start: 2017-07-14 | End: 2017-07-15 | Stop reason: HOSPADM

## 2017-07-14 RX ORDER — RAMIPRIL 2.5 MG/1
10 CAPSULE ORAL 2 TIMES DAILY
Status: DISCONTINUED | OUTPATIENT
Start: 2017-07-14 | End: 2017-07-15 | Stop reason: HOSPADM

## 2017-07-14 RX ORDER — ASPIRIN 81 MG/1
81 TABLET ORAL ONCE
Status: DISCONTINUED | OUTPATIENT
Start: 2017-07-15 | End: 2017-07-15 | Stop reason: HOSPADM

## 2017-07-14 RX ORDER — OXYCODONE AND ACETAMINOPHEN 5; 325 MG/1; MG/1
2 TABLET ORAL EVERY 6 HOURS PRN
Status: DISCONTINUED | OUTPATIENT
Start: 2017-07-14 | End: 2017-07-15 | Stop reason: HOSPADM

## 2017-07-14 RX ORDER — DIPHENHYDRAMINE HYDROCHLORIDE 50 MG/ML
50 INJECTION INTRAMUSCULAR; INTRAVENOUS ONCE
Status: COMPLETED | OUTPATIENT
Start: 2017-07-14 | End: 2017-07-14

## 2017-07-14 RX ORDER — PROMETHAZINE HYDROCHLORIDE 25 MG/ML
INJECTION, SOLUTION INTRAMUSCULAR; INTRAVENOUS CODE/TRAUMA/SEDATION MEDICATION
Status: COMPLETED | OUTPATIENT
Start: 2017-07-14 | End: 2017-07-14

## 2017-07-14 RX ORDER — HYDROMORPHONE HYDROCHLORIDE 1 MG/ML
0.2 INJECTION, SOLUTION INTRAMUSCULAR; INTRAVENOUS; SUBCUTANEOUS
Status: DISCONTINUED | OUTPATIENT
Start: 2017-07-14 | End: 2017-07-15 | Stop reason: HOSPADM

## 2017-07-14 RX ORDER — ONDANSETRON 2 MG/ML
4 INJECTION INTRAMUSCULAR; INTRAVENOUS EVERY 8 HOURS PRN
Status: DISCONTINUED | OUTPATIENT
Start: 2017-07-14 | End: 2017-07-15 | Stop reason: HOSPADM

## 2017-07-14 RX ORDER — METFORMIN HYDROCHLORIDE 850 MG/1
850 TABLET ORAL 2 TIMES DAILY WITH MEALS
Status: DISCONTINUED | OUTPATIENT
Start: 2017-07-15 | End: 2017-07-15 | Stop reason: HOSPADM

## 2017-07-14 RX ORDER — IBUPROFEN 400 MG/1
400 TABLET ORAL EVERY 4 HOURS PRN
Status: DISCONTINUED | OUTPATIENT
Start: 2017-07-14 | End: 2017-07-15 | Stop reason: HOSPADM

## 2017-07-14 RX ADMIN — AMPICILLIN SODIUM AND SULBACTAM SODIUM 3 G: 2; 1 INJECTION, POWDER, FOR SOLUTION INTRAMUSCULAR; INTRAVENOUS at 08:07

## 2017-07-14 RX ADMIN — OCTREOTIDE ACETATE 500 MCG/HR: 1000 INJECTION, SOLUTION INTRAVENOUS; SUBCUTANEOUS at 08:07

## 2017-07-14 RX ADMIN — MIDAZOLAM 1 MG: 1 INJECTION INTRAMUSCULAR; INTRAVENOUS at 09:07

## 2017-07-14 RX ADMIN — AMPICILLIN SODIUM AND SULBACTAM SODIUM 3 G: 2; 1 INJECTION, POWDER, FOR SOLUTION INTRAMUSCULAR; INTRAVENOUS at 06:07

## 2017-07-14 RX ADMIN — OXYCODONE AND ACETAMINOPHEN 2 TABLET: 5; 325 TABLET ORAL at 04:07

## 2017-07-14 RX ADMIN — MAGNESIUM SULFATE IN WATER 2 G: 40 INJECTION, SOLUTION INTRAVENOUS at 08:07

## 2017-07-14 RX ADMIN — ALLOPURINOL 300 MG: 300 TABLET ORAL at 10:07

## 2017-07-14 RX ADMIN — HYDROMORPHONE HYDROCHLORIDE 0.2 MG: 1 INJECTION, SOLUTION INTRAMUSCULAR; INTRAVENOUS; SUBCUTANEOUS at 07:07

## 2017-07-14 RX ADMIN — ONDANSETRON 4 MG: 2 INJECTION INTRAMUSCULAR; INTRAVENOUS at 01:07

## 2017-07-14 RX ADMIN — PROMETHAZINE HYDROCHLORIDE 12.5 MG: 25 INJECTION INTRAMUSCULAR; INTRAVENOUS at 09:07

## 2017-07-14 RX ADMIN — ATORVASTATIN CALCIUM 5 MG: 10 TABLET, FILM COATED ORAL at 10:07

## 2017-07-14 RX ADMIN — OXYCODONE AND ACETAMINOPHEN 2 TABLET: 5; 325 TABLET ORAL at 10:07

## 2017-07-14 RX ADMIN — TERAZOSIN HYDROCHLORIDE ANHYDROUS 1 MG: 1 CAPSULE ORAL at 10:07

## 2017-07-14 RX ADMIN — FENTANYL CITRATE 50 MCG: 50 INJECTION, SOLUTION INTRAMUSCULAR; INTRAVENOUS at 09:07

## 2017-07-14 RX ADMIN — DIPHENHYDRAMINE HYDROCHLORIDE 50 MG: 50 INJECTION, SOLUTION INTRAMUSCULAR; INTRAVENOUS at 08:07

## 2017-07-14 RX ADMIN — ONDANSETRON 16 MG: 2 INJECTION INTRAMUSCULAR; INTRAVENOUS at 07:07

## 2017-07-14 RX ADMIN — RAMIPRIL 10 MG: 2.5 CAPSULE ORAL at 10:07

## 2017-07-14 RX ADMIN — OCTREOTIDE ACETATE 500 MCG: 500 INJECTION, SOLUTION INTRAVENOUS; SUBCUTANEOUS at 07:07

## 2017-07-14 RX ADMIN — SODIUM CHLORIDE 500 ML: 0.9 INJECTION, SOLUTION INTRAVENOUS at 07:07

## 2017-07-14 RX ADMIN — DEXTROSE MONOHYDRATE, SODIUM CHLORIDE, AND POTASSIUM CHLORIDE: 50; 4.5; 1.49 INJECTION, SOLUTION INTRAVENOUS at 06:07

## 2017-07-14 RX ADMIN — FUROSEMIDE 20 MG: 10 INJECTION, SOLUTION INTRAMUSCULAR; INTRAVENOUS at 10:07

## 2017-07-14 RX ADMIN — DEXTROSE MONOHYDRATE, SODIUM CHLORIDE, AND POTASSIUM CHLORIDE: 50; 4.5; 1.49 INJECTION, SOLUTION INTRAVENOUS at 08:07

## 2017-07-14 RX ADMIN — MITOMYCIN: 5 INJECTION, POWDER, LYOPHILIZED, FOR SOLUTION INTRAVENOUS at 09:07

## 2017-07-14 RX ADMIN — AMPICILLIN SODIUM AND SULBACTAM SODIUM 3 G: 2; 1 INJECTION, POWDER, FOR SOLUTION INTRAMUSCULAR; INTRAVENOUS at 01:07

## 2017-07-14 RX ADMIN — DEXAMETHASONE SODIUM PHOSPHATE 8 MG: 4 INJECTION, SOLUTION INTRAMUSCULAR; INTRAVENOUS at 10:07

## 2017-07-14 RX ADMIN — FAMOTIDINE 20 MG: 20 TABLET, FILM COATED ORAL at 10:07

## 2017-07-14 RX ADMIN — LIDOCAINE HYDROCHLORIDE 5 ML: 10 INJECTION, SOLUTION INFILTRATION; PERINEURAL at 09:07

## 2017-07-14 RX ADMIN — DEXTROSE MONOHYDRATE, SODIUM CHLORIDE, AND POTASSIUM CHLORIDE: 50; 4.5; 1.49 INJECTION, SOLUTION INTRAVENOUS at 10:07

## 2017-07-14 NOTE — NURSING TRANSFER
Nursing Transfer Note      7/14/2017     Transfer  To  room 526 for IR    Transfer via wheelchair    Transfer with personal belongings and family at bedside    Transported by staff, william Ellison, RN and SUZANNA Ornelas RN    Medicines sent: octreotide gtt infusing, antibiotics, and iv fluids    Chart send with patient: yes    Notified: [place] and nurse upon arrival to floor.     Patient reassessed at: 7/14/2017 1340    Upon arrival to floor: Patient assisted to bed with out incident. No distress noted. Bed in low position, call light with in reach and bed alarm activated. Nurse at bedside.

## 2017-07-14 NOTE — H&P
Radiology History & Physical      SUBJECTIVE:     Chief Complaint: Metastatic NET    History of Present Illness:  Delmar Mckee is a 81 y.o. male who presents for TACE to areas of residual disease.  Past Medical History:   Diagnosis Date    Diabetes mellitus type II, controlled     Drug therapy     lanreotide 6/2016    HTN (hypertension) 2016    Hypothyroidism     Malignant carcinoid tumor of ileum 2009     Past Surgical History:   Procedure Laterality Date    ABDOMINAL SURGERY  2009    bland embo  2012, 2013    CARDIAC ELECTROPHYSIOLOGY STUDY AND ABLATION      drug therapy      sandostatin 30 mg every 28 days    drug trial  2015 summer    pazopanib    nanoknofe  2011    RFA  2011    Pt denies having in 2012, 2013 (7/26/16)    ROTATOR CUFF REPAIR      SINUS SURGERY      [place]2016[place]    [facility]       [place] Meds:   Prior to Admission medications    Medication Sig Start Date End Date Taking? Authorizing Provider   atorvastatin (LIPITOR) 10 MG tablet 5 mg every evening.  7/12/16  Yes Historical Provider, MD   cholecalciferol, vitamin D3, 2,000 unit Tab Take 1 tablet by mouth once daily.    Yes Historical Provider, MD   cholestyramine (QUESTRAN) 4 gram packet TAKE 1 PACKET once daily 7/24/16  Yes Historical Provider, MD   cinnamon bark 500 mg capsule Take by mouth.   Yes Historical Provider, MD   co-enzyme Q-10 30 mg capsule Take 30 mg by mouth once daily.    Yes Historical Provider, MD   CREON 24,000-76,000 -120,000 unit capsule Take 2 capsules by mouth 4 (four) times daily with meals and nightly.  Patient taking differently: Take 2 capsules by mouth 3 (three) times daily with meals.  1/17/17  Yes Bogdan Waterman MD   eszopiclone (LUNESTA) 1 MG Tab Take by mouth. 6/13/17  Yes Historical Provider, MD   lactulose (CEPHULAC) 20 gram Pack Take 1 packet (20 g total) by mouth 3 (three) times daily. 7/13/17  Yes Aparna Quintanilla MD   lactulose (CHRONULAC) 10 gram/15 mL solution Take 10 g by  mouth as needed.    Yes Historical Provider, MD   lanreotide (SOMATULINE DEPOT) 120 mg/0.5 mL Syrg Inject 120 mg into the skin every 28 days.   Yes Historical Provider, MD   levothyroxine (SYNTHROID, LEVOTHROID) 175 MCG tablet Take 175 mcg by mouth once daily. 5/2/16  Yes Historical Provider, MD   metformin (GLUCOPHAGE) 850 MG tablet Take 850 mg by mouth 2 (two) times daily with meals.  7/6/16  Yes Historical Provider, MD   multivitamin (THERAGRAN) per tablet Take 1 tablet by mouth once daily.    Yes Historical Provider, MD   nateglinide (STARLIX) 60 MG tablet Take 60 mg by mouth 3 (three) times daily before meals.   Yes Historical Provider, MD   ondansetron (ZOFRAN) 4 MG tablet Take 1 tablet (4 mg total) by mouth every 8 (eight) hours as needed for Nausea. 7/13/17  Yes Aparan Quintanilla MD   ondansetron (ZOFRAN-ODT) 4 MG TbDL Take 1 tablet (4 mg total) by mouth every 6 (six) hours as needed. 3/20/17  Yes Aparna Quintanilla MD   oxycodone-acetaminophen (PERCOCET) 5-325 mg per tablet Take 1 tablet by mouth every 4 (four) hours as needed for Pain. 7/13/17  Yes Aparna Quintanilla MD   ramipril (ALTACE) 10 MG capsule 2 (two) times daily.  7/9/16  Yes Historical Provider, MD   saw palmetto fruit 450 mg Cap Take 1 capsule by mouth 2 (two) times daily.    Yes Historical Provider, MD   temazepam (RESTORIL) 15 mg Cap 15 mg every evening.  7/9/16  Yes Historical Provider, MD   terazosin (HYTRIN) 1 MG capsule 1 mg every evening.  7/12/16  Yes Historical Provider, MD   tramadol (ULTRAM) 50 mg tablet Take 1 tablet (50 mg total) by mouth every 6 (six) hours as needed for Pain. 3/11/17  Yes Day Ovalles MD   vitamin A 8000 UNIT capsule Take 8,000 Units by mouth.   Yes Historical Provider, MD   VITAMIN B-12 1,000 mcg TbSR PLACE 1 TABLET UNDER TONGUE DAILY 5/18/17  Yes Historical Provider, MD   aspirin (ECOTRIN) 81 MG EC tablet Take by mouth.    Historical Provider, MD   ciprofloxacin HCl (CIPRO) 500 MG tablet  Take 1 tablet (500 mg total) by mouth 2 (two) times daily. 7/13/17   Aparna Quintanilla MD     Anticoagulants/Antiplatelets: no anticoagulation    Allergies:   Review of patient's allergies indicates:   Allergen Reactions    Epinephrine Anaphylaxis     Can cause Carcinoid Crisis    Latex, natural rubber Rash     Sedation History:  no adverse reactions    Review of Systems:   Hematological: no known coagulopathies  Respiratory: no shortness of breath  Cardiovascular: no chest pain  Gastrointestinal: no abdominal pain  Genito-Urinary: no dysuria  Musculoskeletal: negative  Neurological: no TIA or stroke symptoms         OBJECTIVE:     Vital Signs (Most Recent)  Temp: 97.6 °F (36.4 °C) (07/14/17 0721)  Pulse: (!) 49 (07/14/17 0721)  Resp: 16 (07/14/17 0721)  BP: (!) 164/92 (07/14/17 0721)  SpO2: 99 % (07/14/17 0721)    Physical Exam:  ASA: 2  Mallampati: 2    General: no acute distress  Mental Status: alert and oriented to person, place and time  HEENT: normocephalic, atraumatic  Chest: unlabored breathing  Heart: regular heart rate  Abdomen: nondistended  Extremity: moves all extremities    Laboratory  Lab Results   Component Value Date    INR 1.1 07/13/2017       Lab Results   Component Value Date    WBC 6.38 07/13/2017    HGB 12.9 (L) 07/13/2017    HCT 40.2 07/13/2017    MCV 92 07/13/2017     (L) 07/13/2017      Lab Results   Component Value Date    GLU 79 07/13/2017     07/13/2017    K 3.8 07/13/2017     07/13/2017    CO2 24 07/13/2017    BUN 16 07/13/2017    CREATININE 1.0 07/13/2017    CALCIUM 9.9 07/13/2017    ALT 68 (H) 07/13/2017    AST 81 (H) 07/13/2017    ALBUMIN 3.5 07/13/2017    BILITOT 0.7 07/13/2017       ASSESSMENT/PLAN:     Sedation Plan: Moderate  Patient will undergo TACE to areas of residual disease.    Jayme Tinsley M.D.  Diagnostic and Interventional Radiologist  Department of Radiology  Pager: [phone]

## 2017-07-14 NOTE — PLAN OF CARE
Problem: Patient Care Overview  Goal: Plan of Care Review  Outcome: Ongoing (interventions implemented as appropriate)  Pt in NAD, bed in lowest position, call light within reach, siderails up x2. Pt resting in bed no complaints of pain or nausea. Bilateral pulses 2+. Dressing to right groin remains CDI. IVF and syl infusing as ordered. Updated patient and wife on POC as outlined on whiteboard. Yellow non-skid socks in place. Safety maintained.

## 2017-07-14 NOTE — PROCEDURES
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  A microcatheter was introduced into feeding arteries of a left hepatic lobe tumor and a combination of gelfoam slurry, doxorubicin and mitomycin were administered until near stagnant flow was achieved.  Post procedural angiography revealed no complications.    Right femoral artery angiogram was performed and the sheath was removed.  Hemostasis was achieved using Exoseal technique.  There was no hematoma at the time of hemostasis.    The patient tolerated procedure well.  Please see Imaging report for further details.    Jayme Tinsley M.D.  Diagnostic and Interventional Radiologist  Department of Radiology  Pager: [phone]

## 2017-07-15 VITALS
BODY MASS INDEX: 27.07 KG/M2 | HEIGHT: 69 IN | RESPIRATION RATE: 16 BRPM | HEART RATE: 47 BPM | DIASTOLIC BLOOD PRESSURE: 77 MMHG | TEMPERATURE: 97 F | OXYGEN SATURATION: 96 % | SYSTOLIC BLOOD PRESSURE: 152 MMHG | WEIGHT: 182.75 LBS

## 2017-07-15 PROCEDURE — 94799 UNLISTED PULMONARY SVC/PX: CPT

## 2017-07-15 PROCEDURE — 63600175 PHARM REV CODE 636 W HCPCS: Performed by: SURGERY

## 2017-07-15 PROCEDURE — 25000003 PHARM REV CODE 250: Performed by: SURGERY

## 2017-07-15 PROCEDURE — 94761 N-INVAS EAR/PLS OXIMETRY MLT: CPT

## 2017-07-15 RX ORDER — TRAMADOL HYDROCHLORIDE 50 MG/1
50 TABLET ORAL EVERY 6 HOURS PRN
Qty: 30 TABLET | Refills: 0 | Status: SHIPPED | OUTPATIENT
Start: 2017-07-15

## 2017-07-15 RX ORDER — ONDANSETRON 4 MG/1
4 TABLET, FILM COATED ORAL EVERY 6 HOURS PRN
Qty: 30 TABLET | Refills: 1 | Status: SHIPPED | OUTPATIENT
Start: 2017-07-15

## 2017-07-15 RX ORDER — CIPROFLOXACIN 500 MG/1
500 TABLET ORAL 2 TIMES DAILY
Qty: 14 TABLET | Refills: 0 | Status: SHIPPED | OUTPATIENT
Start: 2017-07-15 | End: 2017-07-22

## 2017-07-15 RX ADMIN — AMPICILLIN SODIUM AND SULBACTAM SODIUM 3 G: 2; 1 INJECTION, POWDER, FOR SOLUTION INTRAMUSCULAR; INTRAVENOUS at 12:07

## 2017-07-15 RX ADMIN — METFORMIN HYDROCHLORIDE 850 MG: 850 TABLET, FILM COATED ORAL at 06:07

## 2017-07-15 RX ADMIN — PANCRELIPASE 2 CAPSULE: 24000; 76000; 120000 CAPSULE, DELAYED RELEASE PELLETS ORAL at 06:07

## 2017-07-15 RX ADMIN — LEVOTHYROXINE SODIUM 175 MCG: 125 TABLET ORAL at 06:07

## 2017-07-15 RX ADMIN — NATEGLINIDE 60 MG: 60 TABLET, COATED ORAL at 06:07

## 2017-07-15 RX ADMIN — DEXTROSE MONOHYDRATE, SODIUM CHLORIDE, AND POTASSIUM CHLORIDE: 50; 4.5; 1.49 INJECTION, SOLUTION INTRAVENOUS at 04:07

## 2017-07-15 RX ADMIN — FAMOTIDINE 20 MG: 20 TABLET, FILM COATED ORAL at 08:07

## 2017-07-15 RX ADMIN — AMPICILLIN SODIUM AND SULBACTAM SODIUM 3 G: 2; 1 INJECTION, POWDER, FOR SOLUTION INTRAMUSCULAR; INTRAVENOUS at 06:07

## 2017-07-15 RX ADMIN — RAMIPRIL 10 MG: 2.5 CAPSULE ORAL at 08:07

## 2017-07-15 RX ADMIN — ALLOPURINOL 300 MG: 300 TABLET ORAL at 08:07

## 2017-07-15 RX ADMIN — ONDANSETRON 4 MG: 2 INJECTION INTRAMUSCULAR; INTRAVENOUS at 08:07

## 2017-07-15 RX ADMIN — OXYCODONE AND ACETAMINOPHEN 2 TABLET: 5; 325 TABLET ORAL at 04:07

## 2017-07-15 NOTE — PLAN OF CARE
Discharge orders noted, no HH or HME ordered.       07/15/17 0817   Final Note   Assessment Type Final Discharge Note   Discharge Disposition Home   What phone number can be called within the next 1-3 days to see how you are doing after discharge? [phone]   Hospital Follow Up  Appt(s) scheduled? No  (Pt will call for follow up appt)   Offered [facility] -- Bedside Delivery? n/a   Discharge/Hospital Encounter Summary to (non-[facility]) PCP Yes   Referral to Outpatient Case Management complete? n/a   Referral to / orders for Home Health Complete? n/a   30 day supply of medicines given at discharge, if documented non-compliance / non-adherence? n/a   Any social issues identified prior to discharge? n/a   Did you assess the readiness or willingness of the family or caregiver to support self management of care? n/a   Right Care Referral Info   Post Acute Recommendation No Care     Digna Singh RN Transitional Navigator  [phone]

## 2017-07-15 NOTE — PROGRESS NOTES
"Delmar Mckee is a 81 y.o. male patient. POD#1 s/p TACE for metastatic NET. NO acute events overnight. Pain controlled. Afebrile. Tolerating diet. No nausea/emesis.         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     Temp: 98.2 °F (36.8 °C) (07/15/17 0445)  Pulse: (!) 53 (07/15/17 0445)  Resp: 16 (07/15/17 0445)  BP: (!) 142/67 (07/15/17 0445)  SpO2: (!) 94 % (07/15/17 0359)  Weight: 82.9 kg (182 lb 12.2 oz) (07/14/17 1345)  Height: 5' 9" (175.3 cm) (07/14/17 1345)    Subjective  Objective:  General Appearance:  Comfortable and well-appearing.    Vital signs: (most recent): Blood pressure (!) 142/67, pulse (!) 53, temperature 98.2 °F (36.8 °C), temperature source Oral, resp. rate 16, height 5' 9" (1.753 m), weight 82.9 kg (182 lb 12.2 oz), SpO2 (!) 94 %.  No fever.    Lungs:  Normal effort.  He is not in respiratory distress.    Heart: Normal rate.  Regular rhythm.    Abdomen: Abdomen is soft and non-distended.  There is no abdominal tenderness.       Assessment:  (81 M with metastaic NET s/p TACE. Tolerated procedure well  -D/c home  -Cipro x 1 week  - Repeat CT in 2 weeks  -Pain/nausea meds for home ).       Dougie Glover MD  7/15/2017    "

## 2017-07-15 NOTE — NURSING
Arrived from interventional radiology   Dressing to right groin dry and intact without swelling or oozing . Iv fluids in progress at ordered rate  Wife at bedside  Orders reviewed with pt  Pedal pulse bilateral strong

## 2017-07-15 NOTE — DISCHARGE SUMMARY
[facility] General Surgery  Neuroendocrine Tumor Service  Discharge Summary      Patient Name: Delmar Mckee  MRN: [id number]  Admission Date: 7/14/2017  Hospital Length of Stay: 1 days  Discharge Date and Time:  07/15/2017 10:35 AM  Attending Physician: No att. providers found   Discharging Provider: Dougie Glover MD  Primary Care Provider: Al Modi DO         Procedure(s) (LRB):  EMBOLIZATION (N/A)     Hospital Course:  81 male with a history of metastatic neuroendocrine tumor was admitted on 7/14 for chemoembolization of liver mets. He tolerated the procedure without difficulty and was transferred to the floor post procedurally.  ON POD#1 he was tolerating a diet, pain was well controlled, vital signs were stable, he was afebrile and it was deemed he was stable for discharge home.     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 Call in 2 weeks.    Specialty:  General Surgery  Why:  Follow-Up  Contact information:  [address]  [address]  [facility] [id number] [phone]                 Patient Instructions:     Diet general     Activity as tolerated     Call MD for:  temperature >100.4     Call MD for:  persistent nausea and vomiting or diarrhea     Call MD for:  severe uncontrolled pain     Call MD for:  redness, tenderness, or signs of infection (pain, swelling, redness, odor or green/yellow discharge around incision site)     No dressing needed       Medications:  Reconciled Home Medications:   Discharge Medication List as of 7/15/2017  9:54 AM      CONTINUE these medications which have  CHANGED    Details   ciprofloxacin HCl (CIPRO) 500 MG tablet Take 1 tablet (500 mg total) by mouth 2 (two) times daily., Starting Sat 7/15/2017, Until Sat 7/22/2017, Print      ondansetron (ZOFRAN) 4 MG tablet Take 1 tablet (4 mg total) by mouth every 6 (six) hours as needed for Nausea., Starting Sat 7/15/2017, Print      tramadol (ULTRAM) 50 mg tablet Take 1 tablet (50 mg total) by mouth every 6 (six) hours as needed for Pain., Starting Sat 7/15/2017, Print         CONTINUE these medications which have NOT CHANGED    Details   atorvastatin (LIPITOR) 10 MG tablet 5 mg every evening. , Starting 7/12/2016, Until Discontinued, Historical Med      cholecalciferol, vitamin D3, 2,000 unit Tab Take 1 tablet by mouth once daily. , Until Discontinued, Historical Med      cholestyramine (QUESTRAN) 4 gram packet TAKE 1 PACKET once daily, Historical Med      cinnamon bark 500 mg capsule Take by mouth., Until Discontinued, Historical Med      co-enzyme Q-10 30 mg capsule Take 30 mg by mouth once daily. , Until Discontinued, Historical Med      CREON 24,000-76,000 -120,000 unit capsule Take 2 capsules by mouth 4 (four) times daily with meals and nightly., Starting 1/17/2017, Until Discontinued, Normal      eszopiclone (LUNESTA) 1 MG Tab Take by mouth., Starting Tue 6/13/2017, Historical Med      lactulose (CEPHULAC) 20 gram Pack Take 1 packet (20 g total) by mouth 3 (three) times daily., Starting Thu 7/13/2017, Print      lactulose (CHRONULAC) 10 gram/15 mL solution Take 10 g by mouth as needed. , Historical Med      lanreotide (SOMATULINE DEPOT) 120 mg/0.5 mL Syrg Inject 120 mg into the skin every 28 days., Until Discontinued, Historical Med      levothyroxine (SYNTHROID, LEVOTHROID) 175 MCG tablet Take 175 mcg by mouth once daily., Starting 5/2/2016, Until Discontinued, Historical Med      metformin (GLUCOPHAGE) 850 MG tablet Take 850 mg by mouth 2 (two) times daily with meals. , Starting 7/6/2016, Until Discontinued,  Historical Med      multivitamin (THERAGRAN) per tablet Take 1 tablet by mouth once daily. , Until Discontinued, Historical Med      nateglinide (STARLIX) 60 MG tablet Take 60 mg by mouth 3 (three) times daily before meals., Until Discontinued, Historical Med      ramipril (ALTACE) 10 MG capsule 2 (two) times daily. , Starting 7/9/2016, Until Discontinued, Historical Med      saw palmetto fruit 450 mg Cap Take 1 capsule by mouth 2 (two) times daily. , Until Discontinued, Historical Med      temazepam (RESTORIL) 15 mg Cap 15 mg every evening. , Starting 7/9/2016, Until Discontinued, Historical Med      terazosin (HYTRIN) 1 MG capsule 1 mg every evening. , Starting 7/12/2016, Until Discontinued, Historical Med      vitamin A 8000 UNIT capsule Take 8,000 Units by mouth., Until Discontinued, Historical Med      VITAMIN B-12 1,000 mcg TbSR PLACE 1 TABLET UNDER TONGUE DAILY, Historical Med      aspirin (ECOTRIN) 81 MG EC tablet Take by mouth., Historical Med         STOP taking these medications       ondansetron (ZOFRAN-ODT) 4 MG TbDL Comments:   Reason for Stopping:         oxycodone-acetaminophen (PERCOCET) 5-325 mg per tablet Comments:   Reason for Stopping:               Dougie Glover MD  General Surgery  Neuroendocrine Tumor Service  [facility]

## 2017-07-15 NOTE — PROGRESS NOTES
Pt given discharge instructions with avs packet.   Pt stated that he recieved prescript on 7/14/2017 from physician. Addition prescriptions left in chart. Pt iv removed per hospital policy. Pt without any other questions at this time. Pt declined wheel chair to main loby; Pt walked down per self.

## 2017-07-15 NOTE — PLAN OF CARE
Problem: Patient Care Overview  Goal: Plan of Care Review  Pts safety maintained, fluids and Sandostatin infusing, meds given per MAR. Dressing in R Groin CDI, some bruising around, no hematoma. Neurovascular function in LRE intact. No N/V, SOB, distress, pain under control with PRN Oxycodone.

## 2017-07-17 ENCOUNTER — TELEPHONE (OUTPATIENT)
Dept: INTERVENTIONAL RADIOLOGY/VASCULAR | Facility: HOSPITAL | Age: 82
End: 2017-07-17

## 2017-07-19 ENCOUNTER — TELEPHONE (OUTPATIENT)
Dept: NEUROLOGY | Facility: HOSPITAL | Age: 82
End: 2017-07-19

## 2017-07-19 NOTE — TELEPHONE ENCOUNTER
----- Message from Jayme Tinsley MD sent at 7/14/2017  3:38 PM CDT -----  Follow up w MRI in 1 month. Hopefully no more tace for him.    Jayme

## 2017-07-19 NOTE — TELEPHONE ENCOUNTER
----- Message from Adwoa Reaves sent at 7/19/2017  3:15 PM CDT -----  Contact: Patient  QUENTIN-  Patient has a fever of 99.6. Patient can be reached at [phone]

## 2017-07-19 NOTE — TELEPHONE ENCOUNTER
Returned pts call. Pt actually just spoke with Dr. Tinsley was informed that low grade fever is to be expected post tace. Also will make an appt w/ PCP to evaluate constipation post TACE. Also informed pt that he should get MRI 1 month post TACE as follow up, per Dr. Tinsley. Pt verbalizes understanding. Will send orders to pts home address in [place]. [address] [place] [address]

## 2017-07-28 LAB
EXT 24 HR UR METANEPHRINE: ABNORMAL
EXT 24 HR UR NORMETANEPHRINE: ABNORMAL
EXT 24 HR UR NORMETANEPHRINE: ABNORMAL
EXT 25 HYDROXY VIT D2: ABNORMAL
EXT 25 HYDROXY VIT D3: ABNORMAL
EXT 5 HIAA 24 HR URINE: 55
EXT 5 HIAA BLOOD: ABNORMAL
EXT ACTH: ABNORMAL
EXT AFP: ABNORMAL
EXT ALBUMIN: 3.5 (ref 3.2–5.5)
EXT ALKALINE PHOSPHATASE: 1052 (ref 42–121)
EXT ALT: 83 (ref 10–60)
EXT AMYLASE: ABNORMAL
EXT ANTI ISLET CELL AB: ABNORMAL
EXT ANTI PARIETAL CELL AB: ABNORMAL
EXT ANTI THYROID AB: ABNORMAL
EXT AST: 82 (ref 10–42)
EXT BILIRUBIN DIRECT: ABNORMAL MG/DL
EXT BILIRUBIN TOTAL: 0.8 (ref 0.2–1)
EXT BK VIRUS DNA QN PCR: ABNORMAL
EXT BUN: 16 (ref 6–20)
EXT C PEPTIDE: ABNORMAL
EXT CA 125: ABNORMAL
EXT CA 19-9: ABNORMAL
EXT CA 27-29: ABNORMAL
EXT CALCITONIN: ABNORMAL
EXT CALCIUM: 9.5 (ref 8.5–10.5)
EXT CEA: ABNORMAL
EXT CHLORIDE: 105 (ref 101–111)
EXT CHOLESTEROL: ABNORMAL
EXT CHROMOGRANIN A: 579
EXT CO2: 26 (ref 21–31)
EXT CREATININE UA: ABNORMAL
EXT CREATININE: 1.04 MG/DL (ref 0.5–1.2)
EXT CYCLOSPORONE LEVEL: ABNORMAL
EXT DOPAMINE: ABNORMAL
EXT EBV DNA BY PCR: ABNORMAL
EXT EPINEPHRINE: ABNORMAL
EXT FOLATE: ABNORMAL
EXT FREE T3: ABNORMAL
EXT FREE T4: ABNORMAL
EXT FSH: ABNORMAL
EXT GASTRIN RELEASING PEPTIDE: ABNORMAL
EXT GASTRIN RELEASING PEPTIDE: ABNORMAL
EXT GASTRIN: ABNORMAL
EXT GGT: ABNORMAL
EXT GHRELIN: ABNORMAL
EXT GLUCAGON: ABNORMAL
EXT GLUCOSE: 127 (ref 70–115)
EXT GROWTH HORMONE: ABNORMAL
EXT HCV RNA QUANT PCR: ABNORMAL
EXT HDL: ABNORMAL
EXT HEMATOCRIT: 40.7 (ref 41–53)
EXT HEMOGLOBIN A1C: ABNORMAL
EXT HEMOGLOBIN: 12.7 (ref 13.5–17.5)
EXT HISTAMINE 24 HR URINE: ABNORMAL
EXT HISTAMINE: ABNORMAL
EXT IGF-1: ABNORMAL
EXT IMMUNKNOW (NON-STIMULATED): ABNORMAL
EXT IMMUNKNOW (STIMULATED): ABNORMAL
EXT INR: ABNORMAL
EXT INSULIN: ABNORMAL
EXT LANREOTIDE LEVEL: ABNORMAL
EXT LDH, TOTAL: ABNORMAL
EXT LDL CHOLESTEROL: ABNORMAL
EXT LIPASE: ABNORMAL
EXT MAGNESIUM: ABNORMAL
EXT METANEPHRINE FREE PLASMA: ABNORMAL
EXT MOTILIN: ABNORMAL
EXT NEUROKININ A CAMB: ABNORMAL
EXT NEUROKININ A ISI: ABNORMAL
EXT NEUROTENSIN: ABNORMAL
EXT NOREPINEPHRINE: ABNORMAL
EXT NORMETANEPHRINE: ABNORMAL
EXT NSE: ABNORMAL
EXT OCTREOTIDE LEVEL: ABNORMAL
EXT PANCREASTATIN CAMB: ABNORMAL
EXT PANCREASTATIN ISI: ABNORMAL
EXT PANCREATIC POLYPEPTIDE: ABNORMAL
EXT PHOSPHORUS: ABNORMAL
EXT PLATELETS: 193 (ref 150–440)
EXT POTASSIUM: 4.1 (ref 3.6–5)
EXT PROGRAF LEVEL: ABNORMAL
EXT PROLACTIN: ABNORMAL
EXT PROTEIN TOTAL: 7.7 (ref 6.7–8.2)
EXT PROTEIN UA: ABNORMAL
EXT PT: ABNORMAL
EXT PTH, INTACT: ABNORMAL
EXT PTT: ABNORMAL
EXT RAPAMUNE LEVEL: ABNORMAL
EXT SEROTONIN: 1150
EXT SODIUM: 141 MMOL/L (ref 135–145)
EXT SOMATOSTATIN: ABNORMAL
EXT SUBSTANCE P: ABNORMAL
EXT TRIGLYCERIDES: ABNORMAL
EXT TRYPTASE: ABNORMAL
EXT TSH: ABNORMAL
EXT URIC ACID: ABNORMAL
EXT URINE AMYLASE U/HR: ABNORMAL
EXT URINE AMYLASE U/L: ABNORMAL
EXT VASOACTIVE INTESTINAL POLYPEPTIDE: ABNORMAL
EXT VITAMIN B12: ABNORMAL
EXT VMA 24 HR URINE: ABNORMAL
EXT WBC: 6.4 (ref 4.5–11)
NEURON SPECIFIC ENOLASE: ABNORMAL

## 2017-08-23 ENCOUNTER — TELEPHONE (OUTPATIENT)
Dept: NEUROLOGY | Facility: HOSPITAL | Age: 82
End: 2017-08-23

## 2017-08-23 NOTE — TELEPHONE ENCOUNTER
----- Message from Adwoa Reaves sent at 8/23/2017 10:50 AM CDT -----  Contact: Patient  QUENTIN- patient would like the doctor to call him back in regards to his MRI results. Patient can be reached at [phone], if no answer call [phone].

## 2017-09-06 ENCOUNTER — TELEPHONE (OUTPATIENT)
Dept: NEUROLOGY | Facility: HOSPITAL | Age: 82
End: 2017-09-06

## 2017-09-06 NOTE — TELEPHONE ENCOUNTER
----- Message from Adwoa Reaves sent at 9/5/2017 10:16 AM CDT -----  Contact: Pateint  QUENTIN- Patient would like test results. Pateint can be reached at  [phone]

## 2017-09-06 NOTE — TELEPHONE ENCOUNTER
Reviewed MRI results with pt and Dr. Tinsley recommends continued follow up. Will clarify with Dr. Rodriguez how frequently he would like pt to have scans and follow up with pt.

## 2017-09-06 NOTE — TELEPHONE ENCOUNTER
----- Message from Jayme Tinsley MD sent at 9/5/2017  5:45 PM CDT -----  Contact: Pateint  No residual. Cont follow up.    Jayme      ----- Message -----  From: Nannette Jean LPN  Sent: 9/5/2017   2:31 PM  To: MD Dr. Alvina Ackerman,   Can you review this pts 1 month post TACE MRI and advise?    Thanks!  Nannette     ----- Message -----  From: Adwoa Reaves  Sent: 9/5/2017  10:16 AM  To: pAarna Montiel Staff    QUENTIN- Patient would like test results. Pateint can be reached at  [phone]

## 2017-09-27 ENCOUNTER — TELEPHONE (OUTPATIENT)
Dept: NEUROLOGY | Facility: HOSPITAL | Age: 82
End: 2017-09-27

## 2017-09-27 NOTE — TELEPHONE ENCOUNTER
----- Message from Robin De Los Santos sent at 9/27/2017  9:23 AM CDT -----  Contact: Pt   Pt will like call regarding concerns    Contact::[phone]

## 2017-09-27 NOTE — TELEPHONE ENCOUNTER
"Patient is concerned because in the shower he palpated 2 lymph nodes in one of his clavicles.  He said one would be where his "muscle" would be in his clavicle If it was with muscles.  He said the other is in the area right above that, slightly the ear.  His PCP said with history of carcinoid he would skip doing FNA in the area and he would have the spots resected.  The patient is very hesitant to have this done.  He has a call to Dr. Melendez.  He says that he has been feeling ill recently and run down and worried he maybe just has an infection or virus and his PCP is not taking that into consideration.  He also is questioning if a small bowel primary can go the lymph nodes in the neck.  I told him anything is possible, but I would get with Dr. Rodriguez and let him know the patient would like he and Dr. Melendez to speak.     "

## 2017-09-28 NOTE — TELEPHONE ENCOUNTER
"Patient states that since yesterday his "bumps" have gone down and he feels like it was a virus or infections.  He did notice a blotchy spot on his foot and thinks it could have been a spider bite and we will try to see if his PCP can order antibiotics, but since they have shrunk such a good bit in 24 hours he is not longer concerned about carcinoid.   "